# Patient Record
Sex: FEMALE | Race: WHITE | Employment: OTHER | URBAN - METROPOLITAN AREA
[De-identification: names, ages, dates, MRNs, and addresses within clinical notes are randomized per-mention and may not be internally consistent; named-entity substitution may affect disease eponyms.]

---

## 2018-03-02 ENCOUNTER — HOSPITAL ENCOUNTER (OUTPATIENT)
Dept: LAB | Age: 43
Discharge: HOME | End: 2018-03-02
Payer: COMMERCIAL

## 2018-03-02 DIAGNOSIS — R07.89 CHEST DISCOMFORT: ICD-10-CM

## 2018-03-02 PROBLEM — Z86.79 H/O CARDIAC MURMUR: Status: ACTIVE | Noted: 2018-03-02

## 2018-03-02 LAB — ERYTHROCYTE [SEDIMENTATION RATE] IN BLOOD: 15 MM/HR (ref 0–20)

## 2018-03-02 PROCEDURE — 36415 COLL VENOUS BLD VENIPUNCTURE: CPT | Performed by: INTERNAL MEDICINE

## 2018-03-02 PROCEDURE — 85652 RBC SED RATE AUTOMATED: CPT | Performed by: INTERNAL MEDICINE

## 2018-04-04 PROBLEM — R07.89 OTHER CHEST PAIN: Status: ACTIVE | Noted: 2018-04-04

## 2018-04-04 PROBLEM — I35.1 NONRHEUMATIC AORTIC VALVE INSUFFICIENCY: Status: ACTIVE | Noted: 2018-04-04

## 2018-04-30 ENCOUNTER — HOSPITAL ENCOUNTER (OUTPATIENT)
Dept: PHYSICAL THERAPY | Age: 43
Discharge: HOME | End: 2018-04-30
Payer: COMMERCIAL

## 2018-04-30 DIAGNOSIS — M54.6 THORACIC SPINE PAIN: ICD-10-CM

## 2018-04-30 DIAGNOSIS — R07.89 COSTOCHONDRAL CHEST PAIN: ICD-10-CM

## 2018-04-30 PROCEDURE — 97162 PT EVAL MOD COMPLEX 30 MIN: CPT

## 2018-04-30 PROCEDURE — 97110 THERAPEUTIC EXERCISES: CPT

## 2018-04-30 NOTE — PROGRESS NOTES
Ambulatory/Rehab Services H2 Model Falls Risk Assessment    Risk Factor Pts. ·   Confusion/Disorientation/Impulsivity  []    4 ·   Symptomatic Depression  []   2 ·   Altered Elimination  []   1 ·   Dizziness/Vertigo  []   1 ·   Gender (Male)  []   1 ·   Any administered antiepileptics (anticonvulsants):  []   2 ·   Any administered benzodiazepines:  []   1 ·   Visual Impairment (specify):  []   1 ·   Portable Oxygen Use  []   1 ·   Orthostatic ? BP  []   1 ·   History of Recent Falls (within 3 mos.)  []   5     Ability to Rise from Chair (choose one) Pts. ·   Ability to rise in a single movement  [x]   0 ·   Pushes up, successful in one attempt  []   1 ·   Multiple attempts, but successful  []   3 ·   Unable to rise without assistance  []   4   Total: (5 or greater = High Risk) 0     Falls Prevention Plan:   []                Physical Limitations to Exercise (specify):   []                Mobility Assistance Device (type):   []                Exercise/Equipment Adaptation (specify):    ©2010 [facility] Patent #[id number].  Federal Law prohibits the replication, distribution or use without written permission from [facility] [employer]

## 2018-04-30 NOTE — THERAPY EVALUATION
Gregorio Davis  : 1975  Payor: [employer] / Plan: [address] HMO / Product Type: HMO /  [address] at [place] [address] Therapy  [address], [address]  [phone]   [phone]         OUTPATIENT PHYSICAL [place] Assessment 2018    ICD-10: Treatment Diagnosis: pain in thoracic spine M54.6, muscle weakness M62.81,     Precautions/Allergies:   Shellfish derived   Fall Risk Score: 0 (? 5 = High Risk)  MD Orders: Eval and treat MEDICAL/REFERRING DIAGNOSIS:  Thoracic spine pain [M54.6]  Costochondral chest pain [R07.1]   DATE OF ONSET: 2018  REFERRING PHYSICIAN: Jossy Amaral, *  RETURN PHYSICIAN APPOINTMENT: as needed     INITIAL ASSESSMENT:  Ms. Darrin Nash presents with increased left chest pain just inferior to the clavicle and then her pain radiates down into the arm slightly and into the shoulder blades and thoracic spine. She reported no injury to the region and the pain worsened in January that she did undergo ER evaluation which came back negative for any cardiac history. She has also had a follow-up with cardiologist which revealed a slight murmur, but echo and treadmill stress testing were normal.  She is reporting her prescribed NSAID is helping to relieve some of the pain. She continues to report folding laundry, housework and computer work can increase her symptoms. She reports lying down relieves her symptoms. She would like to try therapy to see if her symptoms improve. PROBLEM LIST (Impacting functional limitations):  1. Decreased Strength  2. Decreased ADL/Functional Activities  3. Increased Pain  4. Decreased Activity Tolerance INTERVENTIONS PLANNED:  1. Heat  2. Home Exercise Program (HEP)  3. Manual Therapy  4. Therapeutic Exercise/Strengthening  5. Ultrasound (US)   TREATMENT PLAN:  Effective Dates: 2018 TO 2018 (60 days).  Frequency/Duration: 2 times a week for 60 Days    GOALS: (Goals have been discussed and agreed upon with patient.)  Short-Term Functional Goals: Time Frame: 4 weeks  1. Establish independent HEP with no cueing. 2. Pt will be able to report 5/10 pain rating or less with ADL's.  3. Pt will be able to work on her computer for 20 minutes with less pain. Discharge Goals: Time Frame: 8 weeks  1. Pt will be able to report 3/10 pain rating or less with ADL's.  2. Pt will be able to fold laundry, vacuum up to 20 minutes with no increase in symptoms. 3. Pt will be able to increase strength in bilateral UE's by at least one full grade for improved lifting abilities. Rehabilitation Potential For Stated Goals: Good  Regarding Codi Valentine's therapy, I certify that the treatment plan above will be carried out by a therapist or under their direction. Thank you for this referral,  Irving Purcell, PT     Referring Physician Signature: Marina Herrera, Lien Garcia, *              Date                    The information in this section was collected on 4/30/18 (except where otherwise noted). HISTORY:   History of Present Injury/Illness (Reason for Referral):  Ms. Leroy Swan presents with increased left chest pain just inferior to the clavicle and then her pain radiates down into the arm slightly and into the shoulder blades and thoracic spine. She reported no injury to the region and the pain worsened in January that she did undergo ER evaluation which came back negative for any cardiac history. She has also had a follow-up with cardiologist which revealed a slight murmur, but echo and treadmill stress testing were normal.  She is reporting her prescribed NSAID is helping to relieve some of the pain. She continues to report folding laundry, housework and computer work can increase her symptoms. She reports lying down relieves her symptoms. She would like to try therapy to see if her symptoms improve.   Past Medical History/Comorbidities:   Ms. Leroy Swan  has a past medical history of Dyspepsia and other specified disorders of function of stomach; Irregular heart beat; and Obesity (BMI 30-39.9). She also has no past medical history of Difficult intubation; Malignant hyperthermia due to anesthesia; Nausea & vomiting; Pseudocholinesterase deficiency; or Unspecified adverse effect of anesthesia. Ms. Gloria Ramires  has a past surgical history that includes hx orthopaedic; hx  section; hx leep procedure; hx tonsillectomy (childhood); and hx cholecystectomy. Social History/Living Environment:     pt lives with supportive family. She has a 8year old sone and 15year old son  Prior Level of Function/Work/Activity:  Pt works part-time as a   Dominant Side:         RIGHT  Current Medications:       Current Outpatient Prescriptions:     meloxicam (MOBIC) 15 mg tablet, Take 1 Tab by mouth daily. , Disp: 30 Tab, Rfl: 1    cyclobenzaprine (FLEXERIL) 10 mg tablet, Take 1 Tab by mouth nightly., Disp: 30 Tab, Rfl: 1    Magnesium Oxide 500 mg cap, Take  by mouth., Disp: , Rfl:    Date Last Reviewed:  2018     Number of Personal Factors/Comorbidities that affect the Plan of Care: 1-2: MODERATE COMPLEXITY   EXAMINATION:   Palpation:          Increased pain just inferior to clavicle around pec major  ROM:        Bilateral UE motion WFL  Bilateral UE ROM WFL  Cervical ROM WFL                                    Strength:     R UE flex 4/5, abd 4-/5, ER 4-/5, IR 4-/5, biceps 4/5         L UE flex 3+/5, abd 3+/5, ER 3+/5, IR 3+/5, biceps 4-/5            Special Tests: negative Sherial Humbles and negative empty can for left shoulder. She tested positive for pain with resisted pectoral testing  Neurological Screen: na  Balance:  na   Body Structures Involved:  1. Muscles  2. Ligaments Body Functions Affected:  1. Sensory/Pain  2. Neuromusculoskeletal  3. Movement Related Activities and Participation Affected:  1. General Tasks and Demands  2. Self Care  3.  Domestic Life  4. Interpersonal Interactions and Relationships  5. Community, Social and [place]   Number of elements (examined above) that affect the Plan of Care: 4+: HIGH COMPLEXITY   CLINICAL PRESENTATION:   Presentation: Evolving clinical presentation with changing clinical characteristics: MODERATE COMPLEXITY   CLINICAL DECISION MAKING:   Outcome Measure: Tool Used: Disabilities of the Arm, Shoulder and Hand (DASH) Questionnaire - Quick Version  Score:  Initial: 26/55  Most Recent: X/55 (Date: -- )   Interpretation of Score: The DASH is designed to measure the activities of daily living in person's with upper extremity dysfunction or pain. Each section is scored on a 1-5 scale, 5 representing the greatest disability. The scores of each section are added together for a total score of 55. Score 11 12-19 20-28 29-37 38-45 46-54 55   Modifier CH CI CJ CK CL CM CN           Medical Necessity:   · Patient is expected to demonstrate progress in strength and pain levels to increase her ability to use the left arm for housework and computer work. Reason for Services/Other Comments:  · Patient continues to require skilled intervention due to ongoing pain in the left chest along pectorals that radiates into the shoulder blades. this pain affects her ability to perform housework and her computer work. Use of outcome tool(s) and clinical judgement create a POC that gives a: Questionable prediction of patient's progress: MODERATE COMPLEXITY            TREATMENT:   (In addition to Assessment/Re-Assessment sessions the following treatments were rendered)  Pre-treatment Symptoms/Complaints:  Pt reporting she is having mild left sided chest pain. Pain: Initial:   Pain Intensity 1: 8  Post Session:  8/10     THERAPEUTIC EXERCISE: (20 minutes):  Exercises per grid below to improve mobility and strength. Required minimal verbal cues to promote proper body mechanics.   Progressed resistance, range, repetitions and complexity of movement as indicated. Pectoral corner stretching 3x hold 20 sec  Biceps stretching with arm on doorway 3x hold 20 sec  Upper thoracic stretching x 1 hold 20 sec  Review of posterior capsule stretching on left   Prone shoulder ext green TB x 15  Seated h. abd with green TB x 10  Seated scap retract green TB x 12  Evaluation (  xx   ):    Manual Therapy (   na   ): na    Therapeutic Modalities:na    HEP: As above; handouts given to patient for all exercises. Access Code: [email or web address]   URL: [email or web address]   Date: 04/30/2018   Prepared by: Micaela Dayton     Exercises   Corner Pec Major Stretch - 5 reps - 1 sets - 2x daily   Standing Bicep Stretch at Wall - 5 reps - 1 sets - 2x daily   Chest and Bicep Stretch - Arms Behind Back - 5 reps - 1 sets - 2x daily   Standing Shoulder Posterior Capsule Stretch - 5 reps - 1 sets - 2x daily   Standing Lower Cervical and Upper Thoracic Stretch - 5 reps - 1 sets - 2x daily    Treatment/Session Assessment:    · Response to Treatment:  Pt reporting she was slightly sore post assessment and review of HEP. She tested positive for pain with resisted pectoral testing. She had no other issues at the shoulder or the clavicle region. It appears her pain is due to pectoral tightness and weakness along with some postural deficiencies. She should benefit from stretching, strengthening and modalities or taping as needed. · Compliance with Program/Exercises: Will assess as treatment progresses. · Recommendations/Intent for next treatment session: \"Next visit will focus on advancements to more challenging activities\".   Total Treatment Duration: 45 min  PT Patient Time In/Time Out  Time In: 0900  Time Out: 0945  Treatment number [id number] Katt Figueroa, PT

## 2018-05-02 ENCOUNTER — HOSPITAL ENCOUNTER (OUTPATIENT)
Dept: PHYSICAL THERAPY | Age: 43
Discharge: HOME | End: 2018-05-02
Payer: COMMERCIAL

## 2018-05-02 PROCEDURE — 97110 THERAPEUTIC EXERCISES: CPT

## 2018-05-02 PROCEDURE — 97140 MANUAL THERAPY 1/> REGIONS: CPT

## 2018-05-02 NOTE — PROGRESS NOTES
Christen Segovia  : 1975  Payor: Nayely Becerra / Plan: [address] HMO / Product Type: HMO /  [address] [place] [place]  at [place] [address] Therapy  [address], [address]  [phone]   [phone]         OUTPATIENT PHYSICAL [address] Note 2018    ICD-10: Treatment Diagnosis: pain in thoracic spine M54.6, muscle weakness M62.81,     Precautions/Allergies:   Shellfish derived   Fall Risk Score: 0 (? 5 = High Risk)  MD Orders: Eval and treat MEDICAL/REFERRING DIAGNOSIS:  Pain in thoracic spine [M54.6]  Chest pain on breathing [R07.1]   DATE OF ONSET: 2018  REFERRING PHYSICIAN: Velinda Galeazzi, Jean Pierre Farias, *  RETURN PHYSICIAN APPOINTMENT: as needed     INITIAL ASSESSMENT:  Ms. Socorro Groves presents with increased left chest pain just inferior to the clavicle and then her pain radiates down into the arm slightly and into the shoulder blades and thoracic spine. She reported no injury to the region and the pain worsened in January that she did undergo ER evaluation which came back negative for any cardiac history. She has also had a follow-up with cardiologist which revealed a slight murmur, but echo and treadmill stress testing were normal.  She is reporting her prescribed NSAID is helping to relieve some of the pain. She continues to report folding laundry, housework and computer work can increase her symptoms. She reports lying down relieves her symptoms. She would like to try therapy to see if her symptoms improve. PROBLEM LIST (Impacting functional limitations):  1. Decreased Strength  2. Decreased ADL/Functional Activities  3. Increased Pain  4. Decreased Activity Tolerance INTERVENTIONS PLANNED:  1. Heat  2. Home Exercise Program (HEP)  3. Manual Therapy  4. Therapeutic Exercise/Strengthening  5. Ultrasound (US)   TREATMENT PLAN:  Effective Dates: 2018 TO 2018 (60 days).  Frequency/Duration: 2 times a week for 60 Days    GOALS: (Goals have been discussed and agreed upon with patient.)  Short-Term Functional Goals: Time Frame: 4 weeks  1. Establish independent HEP with no cueing. 2. Pt will be able to report 5/10 pain rating or less with ADL's.  3. Pt will be able to work on her computer for 20 minutes with less pain. Discharge Goals: Time Frame: 8 weeks  1. Pt will be able to report 3/10 pain rating or less with ADL's.  2. Pt will be able to fold laundry, vacuum up to 20 minutes with no increase in symptoms. 3. Pt will be able to increase strength in bilateral UE's by at least one full grade for improved lifting abilities. Rehabilitation Potential For Stated Goals: Good  Regarding Carson Valentine's therapy, I certify that the treatment plan above will be carried out by a therapist or under their direction. Thank you for this referral,  Minal Schulte PT     Referring Physician Signature: Mira Huitron, Aleksandr Parish, *              Date                    The information in this section was collected on 4/30/18 (except where otherwise noted). HISTORY:   History of Present Injury/Illness (Reason for Referral):  Ms. Ren Quinn presents with increased left chest pain just inferior to the clavicle and then her pain radiates down into the arm slightly and into the shoulder blades and thoracic spine. She reported no injury to the region and the pain worsened in January that she did undergo ER evaluation which came back negative for any cardiac history. She has also had a follow-up with cardiologist which revealed a slight murmur, but echo and treadmill stress testing were normal.  She is reporting her prescribed NSAID is helping to relieve some of the pain. She continues to report folding laundry, housework and computer work can increase her symptoms. She reports lying down relieves her symptoms. She would like to try therapy to see if her symptoms improve.   Past Medical History/Comorbidities:   Ms. Ren Quinn  has a past medical history of Dyspepsia and other specified disorders of function of stomach; Irregular heart beat; and Obesity (BMI 30-39.9). She also has no past medical history of Difficult intubation; Malignant hyperthermia due to anesthesia; Nausea & vomiting; Pseudocholinesterase deficiency; or Unspecified adverse effect of anesthesia. Ms. Ren Quinn  has a past surgical history that includes hx orthopaedic; hx  section; hx leep procedure; hx tonsillectomy (childhood); and hx cholecystectomy. Social History/Living Environment:     pt lives with supportive family. She has a 8year old sone and 15year old son  Prior Level of Function/Work/Activity:  Pt works part-time as a   Dominant Side:         RIGHT  Current Medications:       Current Outpatient Prescriptions:     meloxicam (MOBIC) 15 mg tablet, Take 1 Tab by mouth daily. , Disp: 30 Tab, Rfl: 1    cyclobenzaprine (FLEXERIL) 10 mg tablet, Take 1 Tab by mouth nightly., Disp: 30 Tab, Rfl: 1    Magnesium Oxide 500 mg cap, Take  by mouth., Disp: , Rfl:    Date Last Reviewed:  2018     Number of Personal Factors/Comorbidities that affect the Plan of Care: 1-2: MODERATE COMPLEXITY   EXAMINATION:   Palpation:          Increased pain just inferior to clavicle around pec major  ROM:        Bilateral UE motion WFL  Bilateral UE ROM WFL  Cervical ROM WFL                                    Strength:     R UE flex 4/5, abd 4-/5, ER 4-/5, IR 4-/5, biceps 4/5         L UE flex 3+/5, abd 3+/5, ER 3+/5, IR 3+/5, biceps 4-/5            Special Tests: negative Tempie Salon and negative empty can for left shoulder. She tested positive for pain with resisted pectoral testing  Neurological Screen: na  Balance:  na   Body Structures Involved:  1. Muscles  2. Ligaments Body Functions Affected:  1. Sensory/Pain  2. Neuromusculoskeletal  3. Movement Related Activities and Participation Affected:  1. General Tasks and Demands  2. Self Care  3. Domestic Life  4.  Interpersonal Interactions and Relationships  5. Community, Social and [place]   Number of elements (examined above) that affect the Plan of Care: 4+: HIGH COMPLEXITY   CLINICAL PRESENTATION:   Presentation: Evolving clinical presentation with changing clinical characteristics: MODERATE COMPLEXITY   CLINICAL DECISION MAKING:   Outcome Measure: Tool Used: Disabilities of the Arm, Shoulder and Hand (DASH) Questionnaire - Quick Version  Score:  Initial: 26/55  Most Recent: X/55 (Date: -- )   Interpretation of Score: The DASH is designed to measure the activities of daily living in person's with upper extremity dysfunction or pain. Each section is scored on a 1-5 scale, 5 representing the greatest disability. The scores of each section are added together for a total score of 55. Score 11 12-19 20-28 29-37 38-45 46-54 55   Modifier CH CI CJ CK CL CM CN           Medical Necessity:   · Patient is expected to demonstrate progress in strength and pain levels to increase her ability to use the left arm for housework and computer work. Reason for Services/Other Comments:  · Patient continues to require skilled intervention due to ongoing pain in the left chest along pectorals that radiates into the shoulder blades. this pain affects her ability to perform housework and her computer work. Use of outcome tool(s) and clinical judgement create a POC that gives a: Questionable prediction of patient's progress: MODERATE COMPLEXITY            TREATMENT:   (In addition to Assessment/Re-Assessment sessions the following treatments were rendered)  Pre-treatment Symptoms/Complaints:  Pt reporting she felt like she was looser and had more ROM after her last treatment, but the next day she was sore. Pain: Initial:   Pain Intensity 1: 7  Post Session:  7/10     THERAPEUTIC EXERCISE: (25 minutes):  Exercises per grid below to improve mobility and strength. Required minimal verbal cues to promote proper body mechanics.   Progressed resistance, range, repetitions and complexity of movement as indicated. UBE x 5 min  Biceps stretching with arm on doorway 3x hold 20 sec  t-bar overhead stretch x 10 hold 5 sec at top  t-bar ext stretching x 10 hold 10 sec  IR 15# x 20  Rows 20# x 20  Punches 15# x 20  Prone shoulder ext 2# x 20  Prone shoulder rows 4# x 20  Prone h. abd with light assistance x 12  Prone scap retract to work on scapular depression-mild cuing required      Manual Therapy (   15 min   ): STM to pectoral region in supine along with some stretching to the pectorals    Therapeutic Modalities:MHP x 10 minutes post treatment for pain relief    HEP: continue as directed  Access Code: [email or web address]   URL: [email or web address]   Date: 04/30/2018   Prepared by: Gregory Slight     Exercises   Corner Pec Major Stretch - 5 reps - 1 sets - 2x daily   Standing Bicep Stretch at Wall - 5 reps - 1 sets - 2x daily   Chest and Bicep Stretch - Arms Behind Back - 5 reps - 1 sets - 2x daily   Standing Shoulder Posterior Capsule Stretch - 5 reps - 1 sets - 2x daily   Standing Lower Cervical and Upper Thoracic Stretch - 5 reps - 1 sets - 2x daily    Treatment/Session Assessment:    · Response to Treatment:  Pt reporting feeling looser initially, but the day post her treatment she was sore. She reported using heat at home and it has helped. She remains tender along the pectoral region and just inferior to the clavicle. Pt received strengthening as well as stretching and STM to help decrease her pain levels. the left arm remains very weak. · Compliance with Program/Exercises: Will assess as treatment progresses. · Recommendations/Intent for next treatment session: \"Next visit will focus on advancements to more challenging activities\".   Total Treatment Duration: 60 min  PT Patient Time In/Time Out  Time In: 1230  Time Out: 0130  Treatment number 2  Benedict Shah, PT

## 2018-05-08 ENCOUNTER — HOSPITAL ENCOUNTER (OUTPATIENT)
Dept: PHYSICAL THERAPY | Age: 43
Discharge: HOME | End: 2018-05-08
Payer: COMMERCIAL

## 2018-05-08 NOTE — PROGRESS NOTES
Therapy Center at [address], [address]  [phone]   [phone]    OUTPATIENT DAILY NOTE    NAME/AGE/GENDER: Jaymie Austin is a 44 y.o. female. DATE: 5/8/2018    Patient cancelled appointment today due to sick child. Will plan to follow up on next scheduled visit.     Inocente Castro, PT

## 2018-05-11 ENCOUNTER — HOSPITAL ENCOUNTER (OUTPATIENT)
Dept: PHYSICAL THERAPY | Age: 43
Discharge: HOME | End: 2018-05-11
Payer: COMMERCIAL

## 2018-05-11 PROCEDURE — 97110 THERAPEUTIC EXERCISES: CPT

## 2018-05-11 PROCEDURE — 97140 MANUAL THERAPY 1/> REGIONS: CPT

## 2018-05-11 NOTE — PROGRESS NOTES
Debo Combs  : 1975  Payor: Marvel Curry / Plan: [address] HMO / Product Type: HMO /  [address] at [place] [address] Therapy  [address], [address]  [phone]   [phone]         OUTPATIENT PHYSICAL [address] Note 2018    ICD-10: Treatment Diagnosis: pain in thoracic spine M54.6, muscle weakness M62.81,     Precautions/Allergies:   Shellfish derived   Fall Risk Score: 0 (? 5 = High Risk)  MD Orders: Eval and treat MEDICAL/REFERRING DIAGNOSIS:  Pain in thoracic spine [M54.6]  Chest pain on breathing [R07.1]   DATE OF ONSET: 2018  REFERRING PHYSICIAN: Ronak Flores, *  RETURN PHYSICIAN APPOINTMENT: as needed     INITIAL ASSESSMENT:  Ms. Arnie Mcintyre presents with increased left chest pain just inferior to the clavicle and then her pain radiates down into the arm slightly and into the shoulder blades and thoracic spine. She reported no injury to the region and the pain worsened in January that she did undergo ER evaluation which came back negative for any cardiac history. She has also had a follow-up with cardiologist which revealed a slight murmur, but echo and treadmill stress testing were normal.  She is reporting her prescribed NSAID is helping to relieve some of the pain. She continues to report folding laundry, housework and computer work can increase her symptoms. She reports lying down relieves her symptoms. She would like to try therapy to see if her symptoms improve. PROBLEM LIST (Impacting functional limitations):  1. Decreased Strength  2. Decreased ADL/Functional Activities  3. Increased Pain  4. Decreased Activity Tolerance INTERVENTIONS PLANNED:  1. Heat  2. Home Exercise Program (HEP)  3. Manual Therapy  4. Therapeutic Exercise/Strengthening  5. Ultrasound (US)   TREATMENT PLAN:  Effective Dates: 2018 TO 2018 (60 days).  Frequency/Duration: 2 times a week for 60 Days    GOALS: (Goals have been discussed and agreed upon with patient.)  Short-Term Functional Goals: Time Frame: 4 weeks  1. Establish independent HEP with no cueing. 2. Pt will be able to report 5/10 pain rating or less with ADL's.  3. Pt will be able to work on her computer for 20 minutes with less pain. Discharge Goals: Time Frame: 8 weeks  1. Pt will be able to report 3/10 pain rating or less with ADL's.  2. Pt will be able to fold laundry, vacuum up to 20 minutes with no increase in symptoms. 3. Pt will be able to increase strength in bilateral UE's by at least one full grade for improved lifting abilities. Rehabilitation Potential For Stated Goals: Good  Regarding Hodan Valentine's therapy, I certify that the treatment plan above will be carried out by a therapist or under their direction. Thank you for this referral,  Dean Dao PT                 The information in this section was collected on 4/30/18 (except where otherwise noted). HISTORY:   History of Present Injury/Illness (Reason for Referral):  Ms. Adelaida Awan presents with increased left chest pain just inferior to the clavicle and then her pain radiates down into the arm slightly and into the shoulder blades and thoracic spine. She reported no injury to the region and the pain worsened in January that she did undergo ER evaluation which came back negative for any cardiac history. She has also had a follow-up with cardiologist which revealed a slight murmur, but echo and treadmill stress testing were normal.  She is reporting her prescribed NSAID is helping to relieve some of the pain. She continues to report folding laundry, housework and computer work can increase her symptoms. She reports lying down relieves her symptoms. She would like to try therapy to see if her symptoms improve.   Past Medical History/Comorbidities:   Ms. Adelaida Awan  has a past medical history of Dyspepsia and other specified disorders of function of stomach; Irregular heart beat; and Obesity (BMI 30-39.9). She also has no past medical history of Difficult intubation; Malignant hyperthermia due to anesthesia; Nausea & vomiting; Pseudocholinesterase deficiency; or Unspecified adverse effect of anesthesia. Ms. Micaela Colvin  has a past surgical history that includes hx orthopaedic; hx  section; hx leep procedure; hx tonsillectomy (childhood); and hx cholecystectomy. Social History/Living Environment:     pt lives with supportive family. She has a 8year old sone and 15year old son  Prior Level of Function/Work/Activity:  Pt works part-time as a   Dominant Side:         RIGHT  Current Medications:       Current Outpatient Prescriptions:     meloxicam (MOBIC) 15 mg tablet, Take 1 Tab by mouth daily. , Disp: 30 Tab, Rfl: 1    cyclobenzaprine (FLEXERIL) 10 mg tablet, Take 1 Tab by mouth nightly., Disp: 30 Tab, Rfl: 1    Magnesium Oxide 500 mg cap, Take  by mouth., Disp: , Rfl:    Date Last Reviewed:  2018     Number of Personal Factors/Comorbidities that affect the Plan of Care: 1-2: MODERATE COMPLEXITY   EXAMINATION:   Palpation:          Increased pain just inferior to clavicle around pec major  ROM:        Bilateral UE motion WFL  Bilateral UE ROM WFL  Cervical ROM WFL                                    Strength:     R UE flex 4/5, abd 4-/5, ER 4-/5, IR 4-/5, biceps 4/5         L UE flex 3+/5, abd 3+/5, ER 3+/5, IR 3+/5, biceps 4-/5            Special Tests: negative [employer] and negative empty can for left shoulder. She tested positive for pain with resisted pectoral testing  Neurological Screen: na  Balance:  na   Body Structures Involved:  1. Muscles  2. Ligaments Body Functions Affected:  1. Sensory/Pain  2. Neuromusculoskeletal  3. Movement Related Activities and Participation Affected:  1. General Tasks and Demands  2. Self Care  3. Domestic Life  4. Interpersonal Interactions and Relationships  5.  Community, Social and [place]   Number of elements (examined above) that affect the Plan of Care: 4+: HIGH COMPLEXITY   CLINICAL PRESENTATION:   Presentation: Evolving clinical presentation with changing clinical characteristics: MODERATE COMPLEXITY   CLINICAL DECISION MAKING:   Outcome Measure: Tool Used: Disabilities of the Arm, Shoulder and Hand (DASH) Questionnaire - Quick Version  Score:  Initial: 26/55  Most Recent: X/55 (Date: -- )   Interpretation of Score: The DASH is designed to measure the activities of daily living in person's with upper extremity dysfunction or pain. Each section is scored on a 1-5 scale, 5 representing the greatest disability. The scores of each section are added together for a total score of 55. Score 11 12-19 20-28 29-37 38-45 46-54 55   Modifier CH CI CJ CK CL CM CN           Medical Necessity:   · Patient is expected to demonstrate progress in strength and pain levels to increase her ability to use the left arm for housework and computer work. Reason for Services/Other Comments:  · Patient continues to require skilled intervention due to ongoing pain in the left chest along pectorals that radiates into the shoulder blades. this pain affects her ability to perform housework and her computer work. Use of outcome tool(s) and clinical judgement create a POC that gives a: Questionable prediction of patient's progress: MODERATE COMPLEXITY            TREATMENT:   (In addition to Assessment/Re-Assessment sessions the following treatments were rendered)  Pre-treatment Symptoms/Complaints:  Pt reporting she is feeling better. She has not had to take the muscle relaxer at night, but continues to take NSAID. Pain: Initial:   Pain Intensity 1: 6  Post Session:  5/10     THERAPEUTIC EXERCISE: (30 minutes):  Exercises per grid below to improve mobility and strength. Required minimal verbal cues to promote proper body mechanics. Progressed resistance, range, repetitions and complexity of movement as indicated.   UBE x 5 min  Biceps stretching with arm on doorway 3x hold 20 sec  t-bar overhead stretch x 10 hold 5 sec at top  t-bar ext stretching x 10 hold 10 sec  IR 15# x 20  Rows 20# x 20  Punches 15# x 20  Prone shoulder ext 2# x 20  Prone shoulder rows 4# x 20  Prone h. abd with light assistance x 12  Prone scap retract to work on scapular depression-mild cuing required  Sidelying ER 3# x 20    Manual Therapy (   20 min   ): STM to pectoral region in supine along with some stretching to the pectorals    Therapeutic Modalities:MHP x 10 minutes post treatment for pain relief    HEP: continue as directed  Access Code: [email or web address]   URL: [email or web address]   Date: 04/30/2018   Prepared by: Elier Rowan     Exercises   Corner Pec Major Stretch - 5 reps - 1 sets - 2x daily   Standing Bicep Stretch at Wall - 5 reps - 1 sets - 2x daily   Chest and Bicep Stretch - Arms Behind Back - 5 reps - 1 sets - 2x daily   Standing Shoulder Posterior Capsule Stretch - 5 reps - 1 sets - 2x daily   Standing Lower Cervical and Upper Thoracic Stretch - 5 reps - 1 sets - 2x daily    Treatment/Session Assessment:    · Response to Treatment:  Pt reporting feeling like the therapy has helped some. She has not had to take her muscle relaxer at night. She continues to have pain with stressing the left shoulder region and she is having most of her discomfort inferior to the clavicle and along the pectoral region. Will continue with current treatment plan. · Compliance with Program/Exercises: Will assess as treatment progresses. · Recommendations/Intent for next treatment session: \"Next visit will focus on advancements to more challenging activities\".   Total Treatment Duration: 60 min  PT Patient Time In/Time Out  Time In: 0900  Time Out: 1000  Treatment number [id number] Katt Figueroa, PT

## 2018-05-15 ENCOUNTER — HOSPITAL ENCOUNTER (OUTPATIENT)
Dept: PHYSICAL THERAPY | Age: 43
Discharge: HOME | End: 2018-05-15
Payer: COMMERCIAL

## 2018-05-15 PROCEDURE — 97110 THERAPEUTIC EXERCISES: CPT

## 2018-05-15 PROCEDURE — 97140 MANUAL THERAPY 1/> REGIONS: CPT

## 2018-05-15 NOTE — PROGRESS NOTES
[facility]  : 1975  Payor: Vik Montes / Plan: [address] HMO / Product Type: HMO /  [address] at [facility] Therapy  [address], [facility], [address]  [phone]   [phone]         OUTPATIENT PHYSICAL [address] Note 5/15/2018    ICD-10: Treatment Diagnosis: pain in thoracic spine M54.6, muscle weakness M62.81,     Precautions/Allergies:   Shellfish derived   Fall Risk Score: 0 (? 5 = High Risk)  MD Orders: Eval and treat MEDICAL/REFERRING DIAGNOSIS:  Pain in thoracic spine [M54.6]  Chest pain on breathing [R07.1]   DATE OF ONSET: 2018  REFERRING PHYSICIAN: Vasquez Corral, *  RETURN PHYSICIAN APPOINTMENT: as needed     INITIAL ASSESSMENT:  Ms. Sergo Sheets presents with increased left chest pain just inferior to the clavicle and then her pain radiates down into the arm slightly and into the shoulder blades and thoracic spine. She reported no injury to the region and the pain worsened in January that she did undergo ER evaluation which came back negative for any cardiac history. She has also had a follow-up with cardiologist which revealed a slight murmur, but echo and treadmill stress testing were normal.  She is reporting her prescribed NSAID is helping to relieve some of the pain. She continues to report folding laundry, housework and computer work can increase her symptoms. She reports lying down relieves her symptoms. She would like to try therapy to see if her symptoms improve. PROBLEM LIST (Impacting functional limitations):  1. Decreased Strength  2. Decreased ADL/Functional Activities  3. Increased Pain  4. Decreased Activity Tolerance INTERVENTIONS PLANNED:  1. Heat  2. Home Exercise Program (HEP)  3. Manual Therapy  4. Therapeutic Exercise/Strengthening  5. Ultrasound (US)   TREATMENT PLAN:  Effective Dates: 2018 TO 2018 (60 days).  Frequency/Duration: 2 times a week for 60 Days    GOALS: (Goals have been discussed and agreed upon with patient.)  Short-Term Functional Goals: Time Frame: 4 weeks  1. Establish independent HEP with no cueing. 2. Pt will be able to report 5/10 pain rating or less with ADL's.  3. Pt will be able to work on her computer for 20 minutes with less pain. Discharge Goals: Time Frame: 8 weeks  1. Pt will be able to report 3/10 pain rating or less with ADL's.  2. Pt will be able to fold laundry, vacuum up to 20 minutes with no increase in symptoms. 3. Pt will be able to increase strength in bilateral UE's by at least one full grade for improved lifting abilities. Rehabilitation Potential For Stated Goals: Good  Regarding Holly Valentine's therapy, I certify that the treatment plan above will be carried out by a therapist or under their direction. Thank you for this referral,  Monica Cifuentes PT                 The information in this section was collected on 4/30/18 (except where otherwise noted). HISTORY:   History of Present Injury/Illness (Reason for Referral):  Ms. Nathen Schulz presents with increased left chest pain just inferior to the clavicle and then her pain radiates down into the arm slightly and into the shoulder blades and thoracic spine. She reported no injury to the region and the pain worsened in January that she did undergo ER evaluation which came back negative for any cardiac history. She has also had a follow-up with cardiologist which revealed a slight murmur, but echo and treadmill stress testing were normal.  She is reporting her prescribed NSAID is helping to relieve some of the pain. She continues to report folding laundry, housework and computer work can increase her symptoms. She reports lying down relieves her symptoms. She would like to try therapy to see if her symptoms improve.   Past Medical History/Comorbidities:   Ms. Nathen Schulz  has a past medical history of Dyspepsia and other specified disorders of function of stomach; Irregular heart beat; and Obesity (BMI 30-39.9). She also has no past medical history of Difficult intubation; Malignant hyperthermia due to anesthesia; Nausea & vomiting; Pseudocholinesterase deficiency; or Unspecified adverse effect of anesthesia. Ms. Sergo Sheets  has a past surgical history that includes hx orthopaedic; hx  section; hx leep procedure; hx tonsillectomy (childhood); and hx cholecystectomy. Social History/Living Environment:     pt lives with supportive family. She has a 8year old sone and 15year old son  Prior Level of Function/Work/Activity:  Pt works part-time as a   Dominant Side:         RIGHT  Current Medications:       Current Outpatient Prescriptions:     meloxicam (MOBIC) 15 mg tablet, Take 1 Tab by mouth daily. , Disp: 30 Tab, Rfl: 1    cyclobenzaprine (FLEXERIL) 10 mg tablet, Take 1 Tab by mouth nightly., Disp: 30 Tab, Rfl: 1    Magnesium Oxide 500 mg cap, Take  by mouth., Disp: , Rfl:    Date Last Reviewed:  5/15/2018     Number of Personal Factors/Comorbidities that affect the Plan of Care: 1-2: MODERATE COMPLEXITY   EXAMINATION:   Palpation:          Increased pain just inferior to clavicle around pec major  ROM:        Bilateral UE motion WFL  Bilateral UE ROM WFL  Cervical ROM WFL                                    Strength:     R UE flex 4/5, abd 4-/5, ER 4-/5, IR 4-/5, biceps 4/5         L UE flex 3+/5, abd 3+/5, ER 3+/5, IR 3+/5, biceps 4-/5            Special Tests: negative Elizabethann Jewel and negative empty can for left shoulder. She tested positive for pain with resisted pectoral testing  Neurological Screen: na  Balance:  na   Body Structures Involved:  1. Muscles  2. Ligaments Body Functions Affected:  1. Sensory/Pain  2. Neuromusculoskeletal  3. Movement Related Activities and Participation Affected:  1. General Tasks and Demands  2. Self Care  3. Domestic Life  4. Interpersonal Interactions and Relationships  5.  Community, Social and [place]   Number of elements (examined above) that affect the Plan of Care: 4+: HIGH COMPLEXITY   CLINICAL PRESENTATION:   Presentation: Evolving clinical presentation with changing clinical characteristics: MODERATE COMPLEXITY   CLINICAL DECISION MAKING:   Outcome Measure: Tool Used: Disabilities of the Arm, Shoulder and Hand (DASH) Questionnaire - Quick Version  Score:  Initial: 26/55  Most Recent: X/55 (Date: -- )   Interpretation of Score: The DASH is designed to measure the activities of daily living in person's with upper extremity dysfunction or pain. Each section is scored on a 1-5 scale, 5 representing the greatest disability. The scores of each section are added together for a total score of 55. Score 11 12-19 20-28 29-37 38-45 46-54 55   Modifier CH CI CJ CK CL CM CN           Medical Necessity:   · Patient is expected to demonstrate progress in strength and pain levels to increase her ability to use the left arm for housework and computer work. Reason for Services/Other Comments:  · Patient continues to require skilled intervention due to ongoing pain in the left chest along pectorals that radiates into the shoulder blades. this pain affects her ability to perform housework and her computer work. Use of outcome tool(s) and clinical judgement create a POC that gives a: Questionable prediction of patient's progress: MODERATE COMPLEXITY            TREATMENT:   (In addition to Assessment/Re-Assessment sessions the following treatments were rendered)  Pre-treatment Symptoms/Complaints:  Pt reporting she overdid it yesterday. She changed linens on her beds and that by the end of the day she had increased pain wrapping around her torso. Pain: Initial:   Pain Intensity 1: 6  Post Session:  5/10     THERAPEUTIC EXERCISE: (30 minutes):  Exercises per grid below to improve mobility and strength. Required minimal verbal cues to promote proper body mechanics.   Progressed resistance, range, repetitions and complexity of movement as indicated. UBE x 5 min  Biceps stretching with arm on doorway 3x hold 20 sec  t-bar overhead stretch x 10 hold 5 sec at top  t-bar ext stretching x 10 hold 10 sec  IR 15# x 20  Rows 20# x 20  Punches 15# x 20-held  Double arm rows 40# x 20  Shoulder pull downs 30# x 20  Prone shoulder ext 2# x 20-held  Prone shoulder rows 4# x 20-held  Prone h. abd with light assistance x 12-held  Prone scap retract to work on scapular depression-mild cuing required-held  Sidelying ER 3# x 20    Manual Therapy (   20 min   ): STM to pectoral region in supine along with some stretching to the pectorals and to left rhomboid region    Therapeutic Modalities:MHP x 10 minutes post treatment for pain relief    HEP: continue as directed  Access Code: [email or web address]   URL: [email or web address]   Date: 04/30/2018   Prepared by: Chip Owens     Exercises   Corner Pec Major Stretch - 5 reps - 1 sets - 2x daily   Standing Bicep Stretch at Wall - 5 reps - 1 sets - 2x daily   Chest and Bicep Stretch - Arms Behind Back - 5 reps - 1 sets - 2x daily   Standing Shoulder Posterior Capsule Stretch - 5 reps - 1 sets - 2x daily   Standing Lower Cervical and Upper Thoracic Stretch - 5 reps - 1 sets - 2x daily    Treatment/Session Assessment:    · Response to Treatment:  Pt reporting some tightness with treatment today. She had trigger point noted along the left rhomboid and close to the thoracic spine region. She reports the pain begins in the pectoral region and when aggravated it wraps around her torso and into the shoulder blade. Pt reporting she does have a history of cervical DDD and this pain may be originating from there. Her treatment is helping overall, but she reports she must be careful to not overdo  It at home. · Compliance with Program/Exercises: Will assess as treatment progresses. · Recommendations/Intent for next treatment session: \"Next visit will focus on advancements to more challenging activities\".   Total Treatment Duration: 60 min  PT Patient Time In/Time Out  Time In: 1115  Time Out: 1215  Treatment number [id number] [facility] Cassius Astudillo PT

## 2018-05-18 ENCOUNTER — HOSPITAL ENCOUNTER (OUTPATIENT)
Dept: PHYSICAL THERAPY | Age: 43
Discharge: HOME | End: 2018-05-18
Payer: COMMERCIAL

## 2018-05-18 PROCEDURE — 97110 THERAPEUTIC EXERCISES: CPT

## 2018-05-18 PROCEDURE — 97140 MANUAL THERAPY 1/> REGIONS: CPT

## 2018-05-18 NOTE — PROGRESS NOTES
Shelli Irvin  : 1975  Payor: Arnoldo Dougherty / Plan: [employer] / Product Type: HMO /  [address]  at [facility]  [address], [address]  [phone]   [phone]         OUTPATIENT PHYSICAL [address] Note 2018    ICD-10: Treatment Diagnosis: pain in thoracic spine M54.6, muscle weakness M62.81,     Precautions/Allergies:   Shellfish derived   Fall Risk Score: 0 (? 5 = High Risk)  MD Orders: Eval and treat MEDICAL/REFERRING DIAGNOSIS:  Pain in thoracic spine [M54.6]  Chest pain on breathing [R07.1]   DATE OF ONSET: 2018  REFERRING PHYSICIAN: Rodolfo Silverio, *  RETURN PHYSICIAN APPOINTMENT: as needed     INITIAL ASSESSMENT:  Ms. Anni Wilson presents with increased left chest pain just inferior to the clavicle and then her pain radiates down into the arm slightly and into the shoulder blades and thoracic spine. She reported no injury to the region and the pain worsened in January that she did undergo ER evaluation which came back negative for any cardiac history. She has also had a follow-up with cardiologist which revealed a slight murmur, but echo and treadmill stress testing were normal.  She is reporting her prescribed NSAID is helping to relieve some of the pain. She continues to report folding laundry, housework and computer work can increase her symptoms. She reports lying down relieves her symptoms. She would like to try therapy to see if her symptoms improve. PROBLEM LIST (Impacting functional limitations):  1. Decreased Strength  2. Decreased ADL/Functional Activities  3. Increased Pain  4. Decreased Activity Tolerance INTERVENTIONS PLANNED:  1. Heat  2. Home Exercise Program (HEP)  3. Manual Therapy  4. Therapeutic Exercise/Strengthening  5. Ultrasound (US)   TREATMENT PLAN:  Effective Dates: 2018 TO 2018 (60 days).  Frequency/Duration: 2 times a week for 60 Days    GOALS: (Goals have been discussed and agreed upon with patient.)  Short-Term Functional Goals: Time Frame: 4 weeks  1. Establish independent HEP with no cueing. 2. Pt will be able to report 5/10 pain rating or less with ADL's.  3. Pt will be able to work on her computer for 20 minutes with less pain. Discharge Goals: Time Frame: 8 weeks  1. Pt will be able to report 3/10 pain rating or less with ADL's.  2. Pt will be able to fold laundry, vacuum up to 20 minutes with no increase in symptoms. 3. Pt will be able to increase strength in bilateral UE's by at least one full grade for improved lifting abilities. Rehabilitation Potential For Stated Goals: Good  Regarding Cami Valentine's therapy, I certify that the treatment plan above will be carried out by a therapist or under their direction. Thank you for this referral,  Shalom Sotelo PT                 The information in this section was collected on 4/30/18 (except where otherwise noted). HISTORY:   History of Present Injury/Illness (Reason for Referral):  Ms. Soy Cortez presents with increased left chest pain just inferior to the clavicle and then her pain radiates down into the arm slightly and into the shoulder blades and thoracic spine. She reported no injury to the region and the pain worsened in January that she did undergo ER evaluation which came back negative for any cardiac history. She has also had a follow-up with cardiologist which revealed a slight murmur, but echo and treadmill stress testing were normal.  She is reporting her prescribed NSAID is helping to relieve some of the pain. She continues to report folding laundry, housework and computer work can increase her symptoms. She reports lying down relieves her symptoms. She would like to try therapy to see if her symptoms improve.   Past Medical History/Comorbidities:   Ms. Soy Cortez  has a past medical history of Dyspepsia and other specified disorders of function of stomach; Irregular heart beat; and Obesity (BMI 30-39.9). She also has no past medical history of Difficult intubation; Malignant hyperthermia due to anesthesia; Nausea & vomiting; Pseudocholinesterase deficiency; or Unspecified adverse effect of anesthesia. Ms. Rey Ward  has a past surgical history that includes hx orthopaedic; hx  section; hx leep procedure; hx tonsillectomy (childhood); and hx cholecystectomy. Social History/Living Environment:     pt lives with supportive family. She has a 8year old sone and 15year old son  Prior Level of Function/Work/Activity:  Pt works part-time as a   Dominant Side:         RIGHT  Current Medications:       Current Outpatient Prescriptions:     meloxicam (MOBIC) 15 mg tablet, Take 1 Tab by mouth daily. , Disp: 30 Tab, Rfl: 1    cyclobenzaprine (FLEXERIL) 10 mg tablet, Take 1 Tab by mouth nightly., Disp: 30 Tab, Rfl: 1    Magnesium Oxide 500 mg cap, Take  by mouth., Disp: , Rfl:    Date Last Reviewed:  2018     Number of Personal Factors/Comorbidities that affect the Plan of Care: 1-2: MODERATE COMPLEXITY   EXAMINATION:   Palpation:          Increased pain just inferior to clavicle around pec major  ROM:        Bilateral UE motion WFL  Bilateral UE ROM WFL  Cervical ROM WFL                                    Strength:     R UE flex 4/5, abd 4-/5, ER 4-/5, IR 4-/5, biceps 4/5         L UE flex 3+/5, abd 3+/5, ER 3+/5, IR 3+/5, biceps 4-/5            Special Tests: negative Josie Pleasure and negative empty can for left shoulder. She tested positive for pain with resisted pectoral testing  Neurological Screen: na  Balance:  na   Body Structures Involved:  1. Muscles  2. Ligaments Body Functions Affected:  1. Sensory/Pain  2. Neuromusculoskeletal  3. Movement Related Activities and Participation Affected:  1. General Tasks and Demands  2. Self Care  3. Domestic Life  4. Interpersonal Interactions and Relationships  5.  Community, Social and [place]   Number of elements (examined above) that affect the Plan of Care: 4+: HIGH COMPLEXITY   CLINICAL PRESENTATION:   Presentation: Evolving clinical presentation with changing clinical characteristics: MODERATE COMPLEXITY   CLINICAL DECISION MAKING:   Outcome Measure: Tool Used: Disabilities of the Arm, Shoulder and Hand (DASH) Questionnaire - Quick Version  Score:  Initial: 26/55  Most Recent: X/55 (Date: -- )   Interpretation of Score: The DASH is designed to measure the activities of daily living in person's with upper extremity dysfunction or pain. Each section is scored on a 1-5 scale, 5 representing the greatest disability. The scores of each section are added together for a total score of 55. Score 11 12-19 20-28 29-37 38-45 46-54 55   Modifier CH CI CJ CK CL CM CN           Medical Necessity:   · Patient is expected to demonstrate progress in strength and pain levels to increase her ability to use the left arm for housework and computer work. Reason for Services/Other Comments:  · Patient continues to require skilled intervention due to ongoing pain in the left chest along pectorals that radiates into the shoulder blades. this pain affects her ability to perform housework and her computer work. Use of outcome tool(s) and clinical judgement create a POC that gives a: Questionable prediction of patient's progress: MODERATE COMPLEXITY            TREATMENT:   (In addition to Assessment/Re-Assessment sessions the following treatments were rendered)  Pre-treatment Symptoms/Complaints:  Pt reporting she is feeling some better today. She did not have to take her muscle relaxer just the NSAID. Pain: Initial:   Pain Intensity 1: 5  Post Session:  5/10     THERAPEUTIC EXERCISE: (30 minutes):  Exercises per grid below to improve mobility and strength. Required minimal verbal cues to promote proper body mechanics. Progressed resistance, range, repetitions and complexity of movement as indicated.   UBE x 5 min  Biceps stretching with arm on doorway 3x hold 20 sec  t-bar overhead stretch x 10 hold 5 sec at top  t-bar ext stretching x 10 hold 10 sec  IR 15# x 20-held  Rows 20# x 20  Punches 15# x 20  Double arm rows 40# x 20  Shoulder pull downs 30# x 20  Prone shoulder ext 2# x 20-  Prone shoulder rows 4# x 20  Prone h. abd with light assistance x 12-held  Prone scap retract to work on scapular depression-mild cuing required  Sidelying ER 3# x 20-held    Manual Therapy (   25 min   ): STM to pectoral region in supine along with some stretching to the pectorals and to left rhomboid region    Therapeutic Modalities:MHP x 10 minutes post treatment for pain relief    HEP: continue as directed  Access Code: [email or web address]   URL: [email or web address]   Date: 04/30/2018   Prepared by: Lizbeth Lambert     Exercises   Corner Pec Major Stretch - 5 reps - 1 sets - 2x daily   Standing Bicep Stretch at Wall - 5 reps - 1 sets - 2x daily   Chest and Bicep Stretch - Arms Behind Back - 5 reps - 1 sets - 2x daily   Standing Shoulder Posterior Capsule Stretch - 5 reps - 1 sets - 2x daily   Standing Lower Cervical and Upper Thoracic Stretch - 5 reps - 1 sets - 2x daily    Treatment/Session Assessment:    · Response to Treatment:  Pt reporting improvements from her last treatment. She has not had to take muscle relaxer, just the NSAID. She does feel some tightening around the scapula with her exercises, but we can stretch it out and perform some trigger point release with improvement. She continues to respond well to the exercises and heat and has not had any wrap around thoracic effects of pain since her last episode. · Compliance with Program/Exercises: Will assess as treatment progresses. · Recommendations/Intent for next treatment session: \"Next visit will focus on advancements to more challenging activities\".   Total Treatment Duration: 65 min  PT Patient Time In/Time Out  Time In: 0945  Time Out: 1050  Treatment number [address] Meghan Diallo, PT

## 2018-05-22 ENCOUNTER — APPOINTMENT (OUTPATIENT)
Dept: PHYSICAL THERAPY | Age: 43
End: 2018-05-22
Payer: COMMERCIAL

## 2018-05-23 ENCOUNTER — HOSPITAL ENCOUNTER (OUTPATIENT)
Dept: PHYSICAL THERAPY | Age: 43
Discharge: HOME | End: 2018-05-23
Payer: COMMERCIAL

## 2018-05-23 PROCEDURE — 97140 MANUAL THERAPY 1/> REGIONS: CPT

## 2018-05-23 PROCEDURE — 97110 THERAPEUTIC EXERCISES: CPT

## 2018-05-30 ENCOUNTER — HOSPITAL ENCOUNTER (OUTPATIENT)
Dept: PHYSICAL THERAPY | Age: 43
Discharge: HOME | End: 2018-05-30
Payer: COMMERCIAL

## 2018-05-30 PROCEDURE — 97110 THERAPEUTIC EXERCISES: CPT

## 2018-05-30 PROCEDURE — 97140 MANUAL THERAPY 1/> REGIONS: CPT

## 2018-05-31 NOTE — PROGRESS NOTES
David Esquivel  : 1975  Payor: Igor Campo / Plan: [address]MO / Product Type: HMO /  [address]  at [place] [address] Therapy  [address], [address]  [phone]   [phone]         OUTPATIENT PHYSICAL THERAPY:Daily Note 2018    ICD-10: Treatment Diagnosis: pain in thoracic spine M54.6, muscle weakness M62.81,     Precautions/Allergies:   Shellfish derived   Fall Risk Score: 0 (? 5 = High Risk)  MD Orders: Eval and treat MEDICAL/REFERRING DIAGNOSIS:  Pain in thoracic spine [M54.6]  Chest pain on breathing [R07.1]   DATE OF ONSET: 2018  REFERRING PHYSICIAN: Margareth Farmer, *  RETURN PHYSICIAN APPOINTMENT: as needed     INITIAL ASSESSMENT:  Ms. Marco Antonio Brady presents with increased left chest pain just inferior to the clavicle and then her pain radiates down into the arm slightly and into the shoulder blades and thoracic spine. She reported no injury to the region and the pain worsened in January that she did undergo ER evaluation which came back negative for any cardiac history. She has also had a follow-up with cardiologist which revealed a slight murmur, but echo and treadmill stress testing were normal.  She is reporting her prescribed NSAID is helping to relieve some of the pain. She continues to report folding laundry, housework and computer work can increase her symptoms. She reports lying down relieves her symptoms. She would like to try therapy to see if her symptoms improve. PROBLEM LIST (Impacting functional limitations):  1. Decreased Strength  2. Decreased ADL/Functional Activities  3. Increased Pain  4. Decreased Activity Tolerance INTERVENTIONS PLANNED:  1. Heat  2. Home Exercise Program (HEP)  3. Manual Therapy  4. Therapeutic Exercise/Strengthening  5. Ultrasound (US)   TREATMENT PLAN:  Effective Dates: 2018 TO 2018 (60 days).  Frequency/Duration: 2 times a week for 60 Days    GOALS: (Goals have been discussed and agreed upon with patient.)  Short-Term Functional Goals: Time Frame: 4 weeks  1. Establish independent HEP with no cueing. 2. Pt will be able to report 5/10 pain rating or less with ADL's.  3. Pt will be able to work on her computer for 20 minutes with less pain. Discharge Goals: Time Frame: 8 weeks  1. Pt will be able to report 3/10 pain rating or less with ADL's.  2. Pt will be able to fold laundry, vacuum up to 20 minutes with no increase in symptoms. 3. Pt will be able to increase strength in bilateral UE's by at least one full grade for improved lifting abilities. Rehabilitation Potential For Stated Goals: Good  Regarding [facility]'s therapy, I certify that the treatment plan above will be carried out by a therapist or under their direction. Thank you for this referral,  Raymond Prather PT                 The information in this section was collected on 4/30/18 (except where otherwise noted). HISTORY:   History of Present Injury/Illness (Reason for Referral):  Ms. Sergo Sheets presents with increased left chest pain just inferior to the clavicle and then her pain radiates down into the arm slightly and into the shoulder blades and thoracic spine. She reported no injury to the region and the pain worsened in January that she did undergo ER evaluation which came back negative for any cardiac history. She has also had a follow-up with cardiologist which revealed a slight murmur, but echo and treadmill stress testing were normal.  She is reporting her prescribed NSAID is helping to relieve some of the pain. She continues to report folding laundry, housework and computer work can increase her symptoms. She reports lying down relieves her symptoms. She would like to try therapy to see if her symptoms improve.   Past Medical History/Comorbidities:   Ms. Sergo Sheets  has a past medical history of Dyspepsia and other specified disorders of function of stomach; Irregular heart beat; and Obesity (BMI 30-39.9). She also has no past medical history of Difficult intubation; Malignant hyperthermia due to anesthesia; Nausea & vomiting; Pseudocholinesterase deficiency; or Unspecified adverse effect of anesthesia. Ms. Jessica Uribe  has a past surgical history that includes hx orthopaedic; hx  section; hx leep procedure; hx tonsillectomy (childhood); and hx cholecystectomy. Social History/Living Environment:     pt lives with supportive family. She has a 8year old sone and 15year old son  Prior Level of Function/Work/Activity:  Pt works part-time as a   Dominant Side:         RIGHT  Current Medications:       Current Outpatient Prescriptions:     meloxicam (MOBIC) 15 mg tablet, Take 1 Tab by mouth daily. , Disp: 30 Tab, Rfl: 1    cyclobenzaprine (FLEXERIL) 10 mg tablet, Take 1 Tab by mouth nightly., Disp: 30 Tab, Rfl: 1    Magnesium Oxide 500 mg cap, Take  by mouth., Disp: , Rfl:    Date Last Reviewed:  2018     Number of Personal Factors/Comorbidities that affect the Plan of Care: 1-2: MODERATE COMPLEXITY   EXAMINATION:   Palpation:          Increased pain just inferior to clavicle around pec major  ROM:        Bilateral UE motion WFL  Bilateral UE ROM WFL  Cervical ROM WFL                                    Strength:     R UE flex 4/5, abd 4-/5, ER 4-/5, IR 4-/5, biceps 4/5         L UE flex 3+/5, abd 3+/5, ER 3+/5, IR 3+/5, biceps 4-/5            Special Tests: negative Kaelyn Iglesias and negative empty can for left shoulder. She tested positive for pain with resisted pectoral testing  Neurological Screen: na  Balance:  na   Body Structures Involved:  1. Muscles  2. Ligaments Body Functions Affected:  1. Sensory/Pain  2. Neuromusculoskeletal  3. Movement Related Activities and Participation Affected:  1. General Tasks and Demands  2. Self Care  3. Domestic Life  4. Interpersonal Interactions and Relationships  5.  Community, Social and [place]   Number of elements (examined above) that affect the Plan of Care: 4+: HIGH COMPLEXITY   CLINICAL PRESENTATION:   Presentation: Evolving clinical presentation with changing clinical characteristics: MODERATE COMPLEXITY   CLINICAL DECISION MAKING:   Outcome Measure: Tool Used: Disabilities of the Arm, Shoulder and Hand (DASH) Questionnaire - Quick Version  Score:  Initial: 26/55  Most Recent: X/55 (Date: -- )   Interpretation of Score: The DASH is designed to measure the activities of daily living in person's with upper extremity dysfunction or pain. Each section is scored on a 1-5 scale, 5 representing the greatest disability. The scores of each section are added together for a total score of 55. Score 11 12-19 20-28 29-37 38-45 46-54 55   Modifier CH CI CJ CK CL CM CN           Medical Necessity:   · Patient is expected to demonstrate progress in strength and pain levels to increase her ability to use the left arm for housework and computer work. Reason for Services/Other Comments:  · Patient continues to require skilled intervention due to ongoing pain in the left chest along pectorals that radiates into the shoulder blades. this pain affects her ability to perform housework and her computer work. Use of outcome tool(s) and clinical judgement create a POC that gives a: Questionable prediction of patient's progress: MODERATE COMPLEXITY            TREATMENT:   (In addition to Assessment/Re-Assessment sessions the following treatments were rendered)  Pre-treatment Symptoms/Complaints:  Pt reporting she will be having a MRI next week on spine. She reported having an increase in pain in her mid to lower back post her last treatment, but the shoulder and pectoral area has improved. Pain: Initial:   Pain Intensity 1: 3  Post Session:  3/10     THERAPEUTIC EXERCISE: (35 minutes):  Exercises per grid below to improve mobility and strength. Required minimal verbal cues to promote proper body mechanics.   Progressed resistance, range, repetitions and complexity of movement as indicated. UBE x 5 min  Biceps stretching with arm on doorway 3x hold 20 sec  t-bar overhead stretch x 10 hold 5 sec at top  t-bar ext stretching x 10 hold 10 sec  IR 15# x 20  Rows 20# x 20  Punches 15# x 20-held  Double arm rows 40# x 20-held  Shoulder pull downs 30# x 20  Prone shoulder ext 2# x 20-held  Prone shoulder rows 4# x 20 - held  Prone h. abd with light assistance x 20 2#-held  Prone scap retract to work on scapular depression-mild cuing required-held  Sidelying ER 3# x 20-held  3 way arm raises on swiss ball x 10-held  Seated on swiss ball with rotation with yellow tubing to bilateral sides x 15  Seated on swiss ball with bilateral rows yellow x 25  Seated on swiss ball with bilateral arm punches x 25  Seated forward trunk stretching on swiss ball x 4   Manual Therapy (   15 min   ): STM to pectoral region in supine along with some stretching to the pectorals and to left rhomboid region    Therapeutic Modalities:MHP x 10 minutes post treatment for pain relief to left shoulder and pectoral region as well as thoracic spine    HEP: continue as directed  Access Code: [email or web address]   URL: [email or web address]   Date: 04/30/2018   Prepared by: Elfego Hernández     Exercises   Corner Pec Major Stretch - 5 reps - 1 sets - 2x daily   Standing Bicep Stretch at Wall - 5 reps - 1 sets - 2x daily   Chest and Bicep Stretch - Arms Behind Back - 5 reps - 1 sets - 2x daily   Standing Shoulder Posterior Capsule Stretch - 5 reps - 1 sets - 2x daily   Standing Lower Cervical and Upper Thoracic Stretch - 5 reps - 1 sets - 2x daily    Treatment/Session Assessment:    · Response to Treatment:  Pt reporting she had increased pain in her thoracic and lower back region. We did add prone arm raises on the swiss ball and this may have caused some of her pain. She reported it did go away gradually.   She continues to feel discomfort in therapy with scap retract work near and around the medial scapular borders on bilateral sides. She improves with forward trunk stretching. She progressed well overall and is scheduled to have MRI next week on spine. She does not report any increased pain at night. · Compliance with Program/Exercises: Will assess as treatment progresses. · Recommendations/Intent for next treatment session: \"Next visit will focus on advancements to more challenging activities\".   Total Treatment Duration: 60 min  PT Patient Time In/Time Out  Time In: 1110  Time Out: 1210  Treatment number [id number], PT

## 2018-06-01 ENCOUNTER — HOSPITAL ENCOUNTER (OUTPATIENT)
Dept: PHYSICAL THERAPY | Age: 43
Discharge: HOME | End: 2018-06-01
Payer: COMMERCIAL

## 2018-06-01 PROCEDURE — 97110 THERAPEUTIC EXERCISES: CPT

## 2018-06-01 PROCEDURE — 97140 MANUAL THERAPY 1/> REGIONS: CPT

## 2018-06-01 NOTE — PROGRESS NOTES
Anjel Barraza  : 1975  Payor: Sofie Grant / Plan: [address] HMO / Product Type: HMO /  [address] at [facility]  [address], [facility], [address]  [phone]   [phone]         OUTPATIENT PHYSICAL THERAPY:Daily Note and Progress Report 2018    ICD-10: Treatment Diagnosis: pain in thoracic spine M54.6, muscle weakness M62.81,     Precautions/Allergies:   Shellfish derived   Fall Risk Score: 0 (? 5 = High Risk)  MD Orders: Eval and treat MEDICAL/REFERRING DIAGNOSIS:  Pain in thoracic spine [M54.6]  Chest pain on breathing [R07.1]   DATE OF ONSET: 2018  REFERRING PHYSICIAN: Alyssa Peralta, *  RETURN PHYSICIAN APPOINTMENT: as needed     INITIAL ASSESSMENT:  Ms. Lakisha Briseno presents with increased left chest pain just inferior to the clavicle and then her pain radiates down into the arm slightly and into the shoulder blades and thoracic spine. She reported no injury to the region and the pain worsened in January that she did undergo ER evaluation which came back negative for any cardiac history. She has also had a follow-up with cardiologist which revealed a slight murmur, but echo and treadmill stress testing were normal.  She is reporting her prescribed NSAID is helping to relieve some of the pain. She continues to report folding laundry, housework and computer work can increase her symptoms. She reports lying down relieves her symptoms. She would like to try therapy to see if her symptoms improve. PROBLEM LIST (Impacting functional limitations):  1. Decreased Strength  2. Decreased ADL/Functional Activities  3. Increased Pain  4. Decreased Activity Tolerance INTERVENTIONS PLANNED:  1. Heat  2. Home Exercise Program (HEP)  3. Manual Therapy  4. Therapeutic Exercise/Strengthening  5. Ultrasound (US)   TREATMENT PLAN:  Effective Dates: 2018 TO 2018 (60 days).  Frequency/Duration: 2 times a week for 60 Days    GOALS: (Goals have been discussed and agreed upon with patient.)  Short-Term Functional Goals: Time Frame: 4 weeks  1. Establish independent HEP with no cueing.-met  2. Pt will be able to report 5/10 pain rating or less with ADL's.-met  3. Pt will be able to work on her computer for 20 minutes with less pain.-met  Discharge Goals: Time Frame: 8 weeks  1. Pt will be able to report 3/10 pain rating or less with ADL's.-intermittently meeting  2. Pt will be able to fold laundry, vacuum up to 20 minutes with no increase in symptoms.-in progress  3. Pt will be able to increase strength in bilateral UE's by at least one full grade for improved lifting abilities.-in progress  Rehabilitation Potential For Stated Goals: Good  Regarding Erin Valentine's therapy, I certify that the treatment plan above will be carried out by a therapist or under their direction. Thank you for this referral,  Madison Lizama PT                 The information in this section was collected on 4/30/18 (except where otherwise noted). HISTORY:   History of Present Injury/Illness (Reason for Referral):  Ms. Darrin Nash presents with increased left chest pain just inferior to the clavicle and then her pain radiates down into the arm slightly and into the shoulder blades and thoracic spine. She reported no injury to the region and the pain worsened in January that she did undergo ER evaluation which came back negative for any cardiac history. She has also had a follow-up with cardiologist which revealed a slight murmur, but echo and treadmill stress testing were normal.  She is reporting her prescribed NSAID is helping to relieve some of the pain. She continues to report folding laundry, housework and computer work can increase her symptoms. She reports lying down relieves her symptoms. She would like to try therapy to see if her symptoms improve.   Past Medical History/Comorbidities:   Ms. Darrin Nash  has a past medical history of Dyspepsia and other specified disorders of function of stomach; Irregular heart beat; and Obesity (BMI 30-39.9). She also has no past medical history of Difficult intubation; Malignant hyperthermia due to anesthesia; Nausea & vomiting; Pseudocholinesterase deficiency; or Unspecified adverse effect of anesthesia. Ms. Arnie Mcintyre  has a past surgical history that includes hx orthopaedic; hx  section; hx leep procedure; hx tonsillectomy (childhood); and hx cholecystectomy. Social History/Living Environment:     pt lives with supportive family. She has a 8year old sone and 15year old son  Prior Level of Function/Work/Activity:  Pt works part-time as a   Dominant Side:         RIGHT  Current Medications:       Current Outpatient Prescriptions:     meloxicam (MOBIC) 15 mg tablet, Take 1 Tab by mouth daily. , Disp: 30 Tab, Rfl: 1    cyclobenzaprine (FLEXERIL) 10 mg tablet, Take 1 Tab by mouth nightly., Disp: 30 Tab, Rfl: 1    Magnesium Oxide 500 mg cap, Take  by mouth., Disp: , Rfl:    Date Last Reviewed:  2018     Number of Personal Factors/Comorbidities that affect the Plan of Care: 1-2: MODERATE COMPLEXITY   EXAMINATION:   Palpation:          Increased pain just inferior to clavicle around pec major  ROM:        Bilateral UE motion WFL  Bilateral UE ROM WFL  Cervical ROM WFL                                    Strength:     R UE flex 4/5, abd 4-/5, ER 4-/5, IR 4-/5, biceps 4/5         L UE flex 3+/5, abd 3+/5, ER 3+/5, IR 3+/5, biceps 4-/5            Special Tests: negative Jennye Mina and negative empty can for left shoulder. She tested positive for pain with resisted pectoral testing  Neurological Screen: na  Balance:  na   Body Structures Involved:  1. Muscles  2. Ligaments Body Functions Affected:  1. Sensory/Pain  2. Neuromusculoskeletal  3. Movement Related Activities and Participation Affected:  1. General Tasks and Demands  2. Self Care  3. Domestic Life  4.  Interpersonal Interactions and Relationships  5. Community, Social and [place]   Number of elements (examined above) that affect the Plan of Care: 4+: HIGH COMPLEXITY   CLINICAL PRESENTATION:   Presentation: Evolving clinical presentation with changing clinical characteristics: MODERATE COMPLEXITY   CLINICAL DECISION MAKING:   Outcome Measure: Tool Used: Disabilities of the Arm, Shoulder and Hand (DASH) Questionnaire - Quick Version  Score:  Initial: 26/55  Most Recent: 23/55 (Date: 6/1/18 )   Interpretation of Score: The DASH is designed to measure the activities of daily living in person's with upper extremity dysfunction or pain. Each section is scored on a 1-5 scale, 5 representing the greatest disability. The scores of each section are added together for a total score of 55. Score 11 12-19 20-28 29-37 38-45 46-54 55   Modifier CH CI CJ CK CL CM CN           Medical Necessity:   · Patient is expected to demonstrate progress in strength and pain levels to increase her ability to use the left arm for housework and computer work. Reason for Services/Other Comments:  · Patient continues to require skilled intervention due to ongoing pain in the left chest along pectorals that radiates into the shoulder blades. this pain affects her ability to perform housework and her computer work. Use of outcome tool(s) and clinical judgement create a POC that gives a: Questionable prediction of patient's progress: MODERATE COMPLEXITY            TREATMENT:   (In addition to Assessment/Re-Assessment sessions the following treatments were rendered)  Pre-treatment Symptoms/Complaints:  Pt reporting she is feeling better. She was sore post last treatment, but it was gone the following day. Pain: Initial:   Pain Intensity 1: 3  Post Session:  3/10     THERAPEUTIC EXERCISE: (35 minutes):  Exercises per grid below to improve mobility and strength. Required minimal verbal cues to promote proper body mechanics.   Progressed resistance, range, repetitions and complexity of movement as indicated. UBE x 5 min  Biceps stretching with arm on doorway 3x hold 20 sec  t-bar overhead stretch x 10 hold 5 sec at top  t-bar ext stretching x 10 hold 10 sec  IR 15# x 20  Rows 20# x 20  Punches 15# x 20-held  Double arm rows 40# x 20  Shoulder pull downs 40# x 20  Prone shoulder ext 4# x 20  Prone shoulder rows 4# x 20 - held  Prone h. abd with light assistance x 20 2#-held  Prone scap retract to work on scapular depression-mild cuing required x 15  Sidelying ER 3# x 20-held  3 way arm raises on swiss ball x 10-held  Seated on swiss ball with rotation with yellow tubing to bilateral sides x 15  Seated on swiss ball with bilateral rows yellow x 25  Seated on swiss ball with bilateral arm punches x 25  Seated forward trunk stretching on swiss ball x 4 -held  Bridging x 20  Manual Therapy (   15 min   ): STM to pectoral region in supine along with some stretching to the pectorals and to left rhomboid region    Therapeutic Modalities:MHP x 10 minutes post treatment for pain relief to left shoulder and pectoral region as well as thoracic spine    HEP: continue as directed  Access Code: [email or web address]   URL: [email or web address]   Date: 04/30/2018   Prepared by: Peyton Fried     Exercises   Corner Pec Major Stretch - 5 reps - 1 sets - 2x daily   Standing Bicep Stretch at Wall - 5 reps - 1 sets - 2x daily   Chest and Bicep Stretch - Arms Behind Back - 5 reps - 1 sets - 2x daily   Standing Shoulder Posterior Capsule Stretch - 5 reps - 1 sets - 2x daily   Standing Lower Cervical and Upper Thoracic Stretch - 5 reps - 1 sets - 2x daily    Treatment/Session Assessment:    · Response to Treatment:  Pt reporting she continues to take NSAId, but has not needed her muscle relaxer. She is progressing well with exercises and at times feels a trigger point on the left medial scapular border. With pressure to it, it can increase her pectoral pain.   Overall she reports feeling better, but is going for MRI next week and she is still struggling with pain increases and has not been able to perform her normal  Housework. She has improved strength by 1/2 grade and has full ROM in cervical spine, thoracic spine and lumbar spine. · Compliance with Program/Exercises: Will assess as treatment progresses. · Recommendations/Intent for next treatment session: \"Next visit will focus on advancements to more challenging activities\".   Total Treatment Duration: 60 min  PT Patient Time In/Time Out  Time In: 1030  Time Out: 1130  Treatment number [address] [place] [address],

## 2018-06-04 ENCOUNTER — HOSPITAL ENCOUNTER (OUTPATIENT)
Dept: PHYSICAL THERAPY | Age: 43
Discharge: HOME | End: 2018-06-04
Payer: COMMERCIAL

## 2018-06-04 NOTE — PROGRESS NOTES
Therapy Center at [address]   [address] [place] [address], [facility] [address]  [phone]   [phone]    OUTPATIENT DAILY NOTE    NAME/AGE/GENDER: Stefany Palafox is a 44 y.o. female. DATE: 6/4/2018    Patient cancelled appointment today. Will plan to follow up on next scheduled visit.     Toyin Wong, PT

## 2018-06-05 ENCOUNTER — HOSPITAL ENCOUNTER (OUTPATIENT)
Dept: MRI IMAGING | Age: 43
Discharge: HOME | End: 2018-06-05
Attending: NURSE PRACTITIONER
Payer: COMMERCIAL

## 2018-06-05 DIAGNOSIS — M54.6 THORACIC SPINE PAIN: ICD-10-CM

## 2018-06-05 PROCEDURE — 72146 MRI CHEST SPINE W/O DYE: CPT

## 2018-06-06 PROBLEM — M51.36 DDD (DEGENERATIVE DISC DISEASE), LUMBAR: Status: ACTIVE | Noted: 2018-06-06

## 2018-06-06 PROBLEM — M51.34 DDD (DEGENERATIVE DISC DISEASE), THORACIC: Status: ACTIVE | Noted: 2018-06-06

## 2018-06-06 NOTE — PROGRESS NOTES
Mild multilevel degenerative disk disease of middle and lower spine. There is mild disc bulging but no significant stenosis. Suggest seeing [address] for facet injections.

## 2018-06-07 ENCOUNTER — APPOINTMENT (OUTPATIENT)
Dept: PHYSICAL THERAPY | Age: 43
End: 2018-06-07
Payer: COMMERCIAL

## 2018-06-12 ENCOUNTER — HOSPITAL ENCOUNTER (OUTPATIENT)
Dept: PHYSICAL THERAPY | Age: 43
Discharge: HOME | End: 2018-06-12
Payer: COMMERCIAL

## 2018-06-12 PROCEDURE — 97110 THERAPEUTIC EXERCISES: CPT

## 2018-06-12 NOTE — PROGRESS NOTES
Mirza Kwok  : 1975  Payor: Tiffanie Donny / Plan: [address] HMO / Product Type: HMO /  [address] at [place] [address] [facility]  [address], [address]  [phone]   [phone]         OUTPATIENT PHYSICAL [address] Note 2018    ICD-10: Treatment Diagnosis: pain in thoracic spine M54.6, muscle weakness M62.81,     Precautions/Allergies:   Shellfish derived   Fall Risk Score: 0 (? 5 = High Risk)  MD Orders: Eval and treat MEDICAL/REFERRING DIAGNOSIS:  Pain in thoracic spine [M54.6]  Chest pain on breathing [R07.1]   DATE OF ONSET: 2018  REFERRING PHYSICIAN: Marina Herrera, Lien Garcia, *  RETURN PHYSICIAN APPOINTMENT: as needed     INITIAL ASSESSMENT:  Ms. Leroy Swan presents with increased left chest pain just inferior to the clavicle and then her pain radiates down into the arm slightly and into the shoulder blades and thoracic spine. She reported no injury to the region and the pain worsened in January that she did undergo ER evaluation which came back negative for any cardiac history. She has also had a follow-up with cardiologist which revealed a slight murmur, but echo and treadmill stress testing were normal.  She is reporting her prescribed NSAID is helping to relieve some of the pain. She continues to report folding laundry, housework and computer work can increase her symptoms. She reports lying down relieves her symptoms. She would like to try therapy to see if her symptoms improve. PROBLEM LIST (Impacting functional limitations):  1. Decreased Strength  2. Decreased ADL/Functional Activities  3. Increased Pain  4. Decreased Activity Tolerance INTERVENTIONS PLANNED:  1. Heat  2. Home Exercise Program (HEP)  3. Manual Therapy  4. Therapeutic Exercise/Strengthening  5. Ultrasound (US)   TREATMENT PLAN:  Effective Dates: 2018 TO 2018 (60 days).  Frequency/Duration: 2 times a week for 60 Days    GOALS: (Goals have been discussed and agreed upon with patient.)  Short-Term Functional Goals: Time Frame: 4 weeks  1. Establish independent HEP with no cueing.-met  2. Pt will be able to report 5/10 pain rating or less with ADL's.-met  3. Pt will be able to work on her computer for 20 minutes with less pain.-met  Discharge Goals: Time Frame: 8 weeks  1. Pt will be able to report 3/10 pain rating or less with ADL's.-intermittently meeting  2. Pt will be able to fold laundry, vacuum up to 20 minutes with no increase in symptoms.-in progress  3. Pt will be able to increase strength in bilateral UE's by at least one full grade for improved lifting abilities.-in progress  Rehabilitation Potential For Stated Goals: Good  Regarding Wily Valentine's therapy, I certify that the treatment plan above will be carried out by a therapist or under their direction. Thank you for this referral,  Carlo Ritchie PT                 The information in this section was collected on 4/30/18 (except where otherwise noted). HISTORY:   History of Present Injury/Illness (Reason for Referral):  Ms. Katlyn Camilo presents with increased left chest pain just inferior to the clavicle and then her pain radiates down into the arm slightly and into the shoulder blades and thoracic spine. She reported no injury to the region and the pain worsened in January that she did undergo ER evaluation which came back negative for any cardiac history. She has also had a follow-up with cardiologist which revealed a slight murmur, but echo and treadmill stress testing were normal.  She is reporting her prescribed NSAID is helping to relieve some of the pain. She continues to report folding laundry, housework and computer work can increase her symptoms. She reports lying down relieves her symptoms. She would like to try therapy to see if her symptoms improve.   Past Medical History/Comorbidities:   Ms. Katlyn Camilo  has a past medical history of Dyspepsia and other specified disorders of function of stomach; Irregular heart beat; and Obesity (BMI 30-39.9). She also has no past medical history of Difficult intubation; Malignant hyperthermia due to anesthesia; Nausea & vomiting; Pseudocholinesterase deficiency; or Unspecified adverse effect of anesthesia. Ms. Cody Diallo  has a past surgical history that includes hx orthopaedic; hx  section; hx leep procedure; hx tonsillectomy (childhood); and hx cholecystectomy. Social History/Living Environment:     pt lives with supportive family. She has a 8year old sone and 15year old son  Prior Level of Function/Work/Activity:  Pt works part-time as a   Dominant Side:         RIGHT  Current Medications:       Current Outpatient Prescriptions:     meloxicam (MOBIC) 15 mg tablet, Take 1 Tab by mouth daily. , Disp: 30 Tab, Rfl: 1    cyclobenzaprine (FLEXERIL) 10 mg tablet, Take 1 Tab by mouth nightly., Disp: 30 Tab, Rfl: 1    Magnesium Oxide 500 mg cap, Take  by mouth., Disp: , Rfl:    Date Last Reviewed:  2018     Number of Personal Factors/Comorbidities that affect the Plan of Care: 1-2: MODERATE COMPLEXITY   EXAMINATION:   Palpation:          Increased pain just inferior to clavicle around pec major  ROM:        Bilateral UE motion WFL  Bilateral UE ROM WFL  Cervical ROM WFL                                    Strength:     R UE flex 4/5, abd 4-/5, ER 4-/5, IR 4-/5, biceps 4/5         L UE flex 3+/5, abd 3+/5, ER 3+/5, IR 3+/5, biceps 4-/5            Special Tests: negative Adrian Pedro and negative empty can for left shoulder. She tested positive for pain with resisted pectoral testing  Neurological Screen: na  Balance:  na   Body Structures Involved:  1. Muscles  2. Ligaments Body Functions Affected:  1. Sensory/Pain  2. Neuromusculoskeletal  3. Movement Related Activities and Participation Affected:  1. General Tasks and Demands  2. Self Care  3. Domestic Life  4.  Interpersonal Interactions and Relationships  5. Community, Social and [place]   Number of elements (examined above) that affect the Plan of Care: 4+: HIGH COMPLEXITY   CLINICAL PRESENTATION:   Presentation: Evolving clinical presentation with changing clinical characteristics: MODERATE COMPLEXITY   CLINICAL DECISION MAKING:   Outcome Measure: Tool Used: Disabilities of the Arm, Shoulder and Hand (DASH) Questionnaire - Quick Version  Score:  Initial: 26/55  Most Recent: 23/55 (Date: 6/1/18 )   Interpretation of Score: The DASH is designed to measure the activities of daily living in person's with upper extremity dysfunction or pain. Each section is scored on a 1-5 scale, 5 representing the greatest disability. The scores of each section are added together for a total score of 55. Score 11 12-19 20-28 29-37 38-45 46-54 55   Modifier CH CI CJ CK CL CM CN           Medical Necessity:   · Patient is expected to demonstrate progress in strength and pain levels to increase her ability to use the left arm for housework and computer work. Reason for Services/Other Comments:  · Patient continues to require skilled intervention due to ongoing pain in the left chest along pectorals that radiates into the shoulder blades. this pain affects her ability to perform housework and her computer work. Use of outcome tool(s) and clinical judgement create a POC that gives a: Questionable prediction of patient's progress: MODERATE COMPLEXITY            TREATMENT:   (In addition to Assessment/Re-Assessment sessions the following treatments were rendered)  Pre-treatment Symptoms/Complaints:  Pt reporting she is feeling better in the pectoral area as she is wearing her brace more with her computer work. The mid back is still sore. Pain: Initial:   Pain Intensity 1: 3  Post Session:  3/10     THERAPEUTIC EXERCISE: (45 minutes):  Exercises per grid below to improve mobility and strength. Required minimal verbal cues to promote proper body mechanics. Progressed resistance, range, repetitions and complexity of movement as indicated. Nu-step level 2 x 6 min  Biceps stretching with arm on doorway 3x hold 20 sec  t-bar overhead stretch x 10 hold 5 sec at top-held  t-bar ext stretching x 10 hold 10 sec-held  IR 15# x 20-held  Rows 20# x 20  Punches 15# x 20-held  Double arm rows 40# x 25  Shoulder pull downs 40# x 25  Prone shoulder ext 4# x 20-held  Prone shoulder rows 4# x 20 - held  Prone h. abd with light assistance x 20 2#-held  Prone scap retract to work on scapular depression-mild cuing required x 15  Sidelying ER 3# x 20-held  3 way arm raises on swiss ball x 10-held  Seated on swiss ball with rotation with green tubing to bilateral sides x 20  Seated on swiss ball with bilateral rows yellow x 25  Seated on swiss ball with bilateral arm punches x 25  Seated forward trunk stretching on swiss ball x 4 -held  Bridging x 20  LTR x 20  Knee to chest x 5 hold 15 sec  Prayer stretch on table 3 hold 20 sec  Arm rows underhand  with mini-squat position  Seated on swiss ball marching x 20  Seated on swiss ball LAQ x 20  Seated on swiss ball with overhead ball raises 6# medball x 15  Manual Therapy (   0 min   ): na    Therapeutic Modalities:MHP x 10 minutes post treatment for pain relief to left shoulder and pectoral region as well as thoracic spine    HEP: continue as directed  Access Code: [email or web address]   URL: [email or web address]   Date: 04/30/2018   Prepared by: Belkis Velez     Exercises   Corner Pec Major Stretch - 5 reps - 1 sets - 2x daily   Standing Bicep Stretch at Wall - 5 reps - 1 sets - 2x daily   Chest and Bicep Stretch - Arms Behind Back - 5 reps - 1 sets - 2x daily   Standing Shoulder Posterior Capsule Stretch - 5 reps - 1 sets - 2x daily   Standing Lower Cervical and Upper Thoracic Stretch - 5 reps - 1 sets - 2x daily    Treatment/Session Assessment:    · Response to Treatment:  Pt underwent MRI which showed multi-level DDD in the spine. She reports the brace is helping her pectoral region, but the mid back is still sore and painful at times. She progressed well, but will perform postural strengthening exercises and then need to stretch as the mid back tightens. Pt received heat post treatment and continues to report it helps. · Compliance with Program/Exercises: Will assess as treatment progresses. · Recommendations/Intent for next treatment session: \"Next visit will focus on advancements to more challenging activities\".   Total Treatment Duration: 55 min  PT Patient Time In/Time Out  Time In: 1130  Time Out: 8365  Treatment number [id number],

## 2018-06-25 ENCOUNTER — HOSPITAL ENCOUNTER (OUTPATIENT)
Dept: PHYSICAL THERAPY | Age: 43
Discharge: HOME | End: 2018-06-25
Payer: COMMERCIAL

## 2018-06-25 PROCEDURE — 97140 MANUAL THERAPY 1/> REGIONS: CPT

## 2018-06-25 PROCEDURE — 97110 THERAPEUTIC EXERCISES: CPT

## 2018-06-25 NOTE — PROGRESS NOTES
Marilyn Ward  : 1975  Payor: Mandy Salazar / Plan: [address] HMO / Product Type: HMO /  [address] at [facility]  [address], [facility], [address]  [phone]   [phone]         OUTPATIENT PHYSICAL THERAPY:Daily Note 2018    ICD-10: Treatment Diagnosis: pain in thoracic spine M54.6, muscle weakness M62.81,     Precautions/Allergies:   Shellfish derived   Fall Risk Score: 0 (? 5 = High Risk)  MD Orders: Eval and treat MEDICAL/REFERRING DIAGNOSIS:  Pain in thoracic spine [M54.6]  Chest pain on breathing [R07.1]   DATE OF ONSET: 2018  REFERRING PHYSICIAN: Julieta Buck, *  RETURN PHYSICIAN APPOINTMENT: as needed     INITIAL ASSESSMENT:  Ms. David Cerda presents with increased left chest pain just inferior to the clavicle and then her pain radiates down into the arm slightly and into the shoulder blades and thoracic spine. She reported no injury to the region and the pain worsened in January that she did undergo ER evaluation which came back negative for any cardiac history. She has also had a follow-up with cardiologist which revealed a slight murmur, but echo and treadmill stress testing were normal.  She is reporting her prescribed NSAID is helping to relieve some of the pain. She continues to report folding laundry, housework and computer work can increase her symptoms. She reports lying down relieves her symptoms. She would like to try therapy to see if her symptoms improve. PROBLEM LIST (Impacting functional limitations):  1. Decreased Strength  2. Decreased ADL/Functional Activities  3. Increased Pain  4. Decreased Activity Tolerance INTERVENTIONS PLANNED:  1. Heat  2. Home Exercise Program (HEP)  3. Manual Therapy  4. Therapeutic Exercise/Strengthening  5. Ultrasound (US)   TREATMENT PLAN:  Effective Dates: 2018 TO 2018 (60 days).  Frequency/Duration: 2 times a week for 60 Days    GOALS: (Goals have been discussed and agreed upon with patient.)  Short-Term Functional Goals: Time Frame: 4 weeks  1. Establish independent HEP with no cueing.-met  2. Pt will be able to report 5/10 pain rating or less with ADL's.-met  3. Pt will be able to work on her computer for 20 minutes with less pain.-met  Discharge Goals: Time Frame: 8 weeks  1. Pt will be able to report 3/10 pain rating or less with ADL's.-intermittently meeting  2. Pt will be able to fold laundry, vacuum up to 20 minutes with no increase in symptoms.-in progress  3. Pt will be able to increase strength in bilateral UE's by at least one full grade for improved lifting abilities.-in progress  Rehabilitation Potential For Stated Goals: Good  Regarding Jordan Valentine's therapy, I certify that the treatment plan above will be carried out by a therapist or under their direction. Thank you for this referral,  Sarwat Gutierrez, PT                 The information in this section was collected on 4/30/18 (except where otherwise noted). HISTORY:   History of Present Injury/Illness (Reason for Referral):  Ms. Carlo Hernandez presents with increased left chest pain just inferior to the clavicle and then her pain radiates down into the arm slightly and into the shoulder blades and thoracic spine. She reported no injury to the region and the pain worsened in January that she did undergo ER evaluation which came back negative for any cardiac history. She has also had a follow-up with cardiologist which revealed a slight murmur, but echo and treadmill stress testing were normal.  She is reporting her prescribed NSAID is helping to relieve some of the pain. She continues to report folding laundry, housework and computer work can increase her symptoms. She reports lying down relieves her symptoms. She would like to try therapy to see if her symptoms improve.   Past Medical History/Comorbidities:   Ms. Carlo Hernandez  has a past medical history of Dyspepsia and other specified disorders of function of stomach; Irregular heart beat; and Obesity (BMI 30-39.9). She also has no past medical history of Difficult intubation; Malignant hyperthermia due to anesthesia; Nausea & vomiting; Pseudocholinesterase deficiency; or Unspecified adverse effect of anesthesia. Ms. Sergo Sheets  has a past surgical history that includes hx orthopaedic; hx  section; hx leep procedure; hx tonsillectomy (childhood); and hx cholecystectomy. Social History/Living Environment:     pt lives with supportive family. She has a 8year old sone and 15year old son  Prior Level of Function/Work/Activity:  Pt works part-time as a   Dominant Side:         RIGHT  Current Medications:       Current Outpatient Prescriptions:     meloxicam (MOBIC) 15 mg tablet, Take 1 Tab by mouth daily. , Disp: 30 Tab, Rfl: 1    cyclobenzaprine (FLEXERIL) 10 mg tablet, Take 1 Tab by mouth nightly., Disp: 30 Tab, Rfl: 1    Magnesium Oxide 500 mg cap, Take  by mouth., Disp: , Rfl:    Date Last Reviewed:  2018     Number of Personal Factors/Comorbidities that affect the Plan of Care: 1-2: MODERATE COMPLEXITY   EXAMINATION:   Palpation:          Increased pain just inferior to clavicle around pec major  ROM:        Bilateral UE motion WFL  Bilateral UE ROM WFL  Cervical ROM WFL                                    Strength:     R UE flex 4/5, abd 4-/5, ER 4-/5, IR 4-/5, biceps 4/5         L UE flex 3+/5, abd 3+/5, ER 3+/5, IR 3+/5, biceps 4-/5            Special Tests: negative Elizabethann Jewel and negative empty can for left shoulder. She tested positive for pain with resisted pectoral testing  Neurological Screen: na  Balance:  na   Body Structures Involved:  1. Muscles  2. Ligaments Body Functions Affected:  1. Sensory/Pain  2. Neuromusculoskeletal  3. Movement Related Activities and Participation Affected:  1. General Tasks and Demands  2. Self Care  3. Domestic Life  4.  Interpersonal Interactions and Relationships  5. Community, Social and [place]   Number of elements (examined above) that affect the Plan of Care: 4+: HIGH COMPLEXITY   CLINICAL PRESENTATION:   Presentation: Evolving clinical presentation with changing clinical characteristics: MODERATE COMPLEXITY   CLINICAL DECISION MAKING:   Outcome Measure: Tool Used: Disabilities of the Arm, Shoulder and Hand (DASH) Questionnaire - Quick Version  Score:  Initial: 26/55  Most Recent: 23/55 (Date: 6/1/18 )   Interpretation of Score: The DASH is designed to measure the activities of daily living in person's with upper extremity dysfunction or pain. Each section is scored on a 1-5 scale, 5 representing the greatest disability. The scores of each section are added together for a total score of 55. Score 11 12-19 20-28 29-37 38-45 46-54 55   Modifier CH CI CJ CK CL CM CN           Medical Necessity:   · Patient is expected to demonstrate progress in strength and pain levels to increase her ability to use the left arm for housework and computer work. Reason for Services/Other Comments:  · Patient continues to require skilled intervention due to ongoing pain in the left chest along pectorals that radiates into the shoulder blades. this pain affects her ability to perform housework and her computer work. Use of outcome tool(s) and clinical judgement create a POC that gives a: Questionable prediction of patient's progress: MODERATE COMPLEXITY            TREATMENT:   (In addition to Assessment/Re-Assessment sessions the following treatments were rendered)  Pre-treatment Symptoms/Complaints:  Pt reporting she was out of town last week. She did a lot of work at her parents house since her mother was ill and she did flare up her mid back region. Pain: Initial:   Pain Intensity 1: 3  Post Session:  3/10     THERAPEUTIC EXERCISE: (40 minutes):  Exercises per grid below to improve mobility and strength.   Required minimal verbal cues to promote proper body mechanics. Progressed resistance, range, repetitions and complexity of movement as indicated. Nu-step level 2 x 6 min-held  Upper arm bike x 6 min  Biceps stretching with arm on doorway 3x hold 20 sec  t-bar overhead stretch x 10 hold 5 sec at top-  t-bar ext stretching x 10 hold 10 sec-  IR 15# x 20-held  Rows 20# x 20  Punches 15# x 20-held  Double arm rows 45# x 25  Shoulder pull downs 45# x 25  Prone shoulder ext 4# x 20-held  Prone shoulder rows 4# x 20 - held  Prone h. abd with light assistance x 20 2#-held  Prone scap retract to work on scapular depression-mild cuing required x 15-held  Sidelying ER 3# x 20-held  3 way arm raises on swiss ball x 10-held  Seated on swiss ball with rotation with green tubing to bilateral sides x 20-held  Seated on swiss ball with bilateral rows yellow x 25-held  Seated on swiss ball with bilateral arm punches x 25-held  Seated forward trunk stretching on swiss ball x 4 -held  Bridging x 20  LTR x 20  Knee to chest x 5 hold 15 sec-held  Prayer stretch on table 3 hold 20 sec  Arm rows underhand  with mini-squat position 45# x 20  Seated on swiss ball marching x 20-held  Seated on swiss ball LAQ x 20-held  Seated on swiss ball with overhead ball raises 6# medball x 15-held  Three way arm raises to 90 x 10  Hor abd. Adduction with red TB x 20  Manual Therapy (   10 min min   ): na    Therapeutic Modalities:MHP x 10 minutes post treatment for pain relief to left shoulder and pectoral region as well as thoracic spine    HEP: continue as directed  Access Code: [email or web address]   URL: [email or web address]   Date: 04/30/2018   Prepared by: Parrish Gordon     Exercises   Corner Pec Major Stretch - 5 reps - 1 sets - 2x daily   Standing Bicep Stretch at Wall - 5 reps - 1 sets - 2x daily   Chest and Bicep Stretch - Arms Behind Back - 5 reps - 1 sets - 2x daily   Standing Shoulder Posterior Capsule Stretch - 5 reps - 1 sets - 2x daily   Standing Lower Cervical and Upper Thoracic Stretch - 5 reps - 1 sets - 2x daily    Treatment/Session Assessment:    · Response to Treatment:  Pt reporting she overdid it at her mother's home. She reported her mom was sick and she had to assist her with household tasks. She progressed well today with therapy and did have some minor tightness around the left scapular border. Performed trigger point release to this region with some relief noted. She will be seeing MD today at [address] [place] [address] for further consultation. · Compliance with Program/Exercises: Will assess as treatment progresses. · Recommendations/Intent for next treatment session: \"Next visit will focus on advancements to more challenging activities\".   Total Treatment Duration: 60 min  PT Patient Time In/Time Out  Time In: 1115  Time Out: 1215  Treatment number [id number] Krystyna Montero PT

## 2018-06-28 ENCOUNTER — HOSPITAL ENCOUNTER (OUTPATIENT)
Dept: PHYSICAL THERAPY | Age: 43
Discharge: HOME | End: 2018-06-28
Payer: COMMERCIAL

## 2018-06-28 PROCEDURE — 97140 MANUAL THERAPY 1/> REGIONS: CPT

## 2018-06-28 PROCEDURE — 97110 THERAPEUTIC EXERCISES: CPT

## 2018-06-28 NOTE — PROGRESS NOTES
Jose A Elizabeth  : 1975  Payor: Jez Borja / Plan: [address] HMO / Product Type: HMO /  [facility] at [place] [address] Therapy  [address], [address]  [phone]   [phone]         OUTPATIENT PHYSICAL THERAPY:Daily Note 2018    ICD-10: Treatment Diagnosis: pain in thoracic spine M54.6, muscle weakness M62.81,     Precautions/Allergies:   Shellfish derived   Fall Risk Score: 0 (? 5 = High Risk)  MD Orders: Eval and treat MEDICAL/REFERRING DIAGNOSIS:  Pain in thoracic spine [M54.6]  Chest pain on breathing [R07.1]   DATE OF ONSET: 2018  REFERRING PHYSICIAN: Ryan Farmer, *  RETURN PHYSICIAN APPOINTMENT: as needed     INITIAL ASSESSMENT:  Ms. Lily Kirk presents with increased left chest pain just inferior to the clavicle and then her pain radiates down into the arm slightly and into the shoulder blades and thoracic spine. She reported no injury to the region and the pain worsened in January that she did undergo ER evaluation which came back negative for any cardiac history. She has also had a follow-up with cardiologist which revealed a slight murmur, but echo and treadmill stress testing were normal.  She is reporting her prescribed NSAID is helping to relieve some of the pain. She continues to report folding laundry, housework and computer work can increase her symptoms. She reports lying down relieves her symptoms. She would like to try therapy to see if her symptoms improve. PROBLEM LIST (Impacting functional limitations):  1. Decreased Strength  2. Decreased ADL/Functional Activities  3. Increased Pain  4. Decreased Activity Tolerance INTERVENTIONS PLANNED:  1. Heat  2. Home Exercise Program (HEP)  3. Manual Therapy  4. Therapeutic Exercise/Strengthening  5. Ultrasound (US)   TREATMENT PLAN:  Effective Dates: 2018 TO 2018 (60 days).  Frequency/Duration: 2 times a week for 60 Days    GOALS: (Goals have been discussed and agreed upon with patient.)  Short-Term Functional Goals: Time Frame: 4 weeks  1. Establish independent HEP with no cueing.-met  2. Pt will be able to report 5/10 pain rating or less with ADL's.-met  3. Pt will be able to work on her computer for 20 minutes with less pain.-met  Discharge Goals: Time Frame: 8 weeks  1. Pt will be able to report 3/10 pain rating or less with ADL's.-intermittently meeting  2. Pt will be able to fold laundry, vacuum up to 20 minutes with no increase in symptoms.-in progress  3. Pt will be able to increase strength in bilateral UE's by at least one full grade for improved lifting abilities.-in progress  Rehabilitation Potential For Stated Goals: Good  Regarding Sandy Valentine's therapy, I certify that the treatment plan above will be carried out by a therapist or under their direction. Thank you for this referral,  Tisha Blankenship PT                 The information in this section was collected on 4/30/18 (except where otherwise noted). HISTORY:   History of Present Injury/Illness (Reason for Referral):  Ms. Neal Gandraa presents with increased left chest pain just inferior to the clavicle and then her pain radiates down into the arm slightly and into the shoulder blades and thoracic spine. She reported no injury to the region and the pain worsened in January that she did undergo ER evaluation which came back negative for any cardiac history. She has also had a follow-up with cardiologist which revealed a slight murmur, but echo and treadmill stress testing were normal.  She is reporting her prescribed NSAID is helping to relieve some of the pain. She continues to report folding laundry, housework and computer work can increase her symptoms. She reports lying down relieves her symptoms. She would like to try therapy to see if her symptoms improve.   Past Medical History/Comorbidities:   Ms. Neal Gandara  has a past medical history of Dyspepsia and other specified disorders of function of stomach; Irregular heart beat; and Obesity (BMI 30-39.9). She also has no past medical history of Difficult intubation; Malignant hyperthermia due to anesthesia; Nausea & vomiting; Pseudocholinesterase deficiency; or Unspecified adverse effect of anesthesia. Ms. Chel Mayes  has a past surgical history that includes hx orthopaedic; hx  section; hx leep procedure; hx tonsillectomy (childhood); and hx cholecystectomy. Social History/Living Environment:     pt lives with supportive family. She has a 8year old sone and 15year old son  Prior Level of Function/Work/Activity:  Pt works part-time as a   Dominant Side:         RIGHT  Current Medications:       Current Outpatient Prescriptions:     meloxicam (MOBIC) 15 mg tablet, Take 1 Tab by mouth daily. , Disp: 30 Tab, Rfl: 1    cyclobenzaprine (FLEXERIL) 10 mg tablet, Take 1 Tab by mouth nightly., Disp: 30 Tab, Rfl: 1    Magnesium Oxide 500 mg cap, Take  by mouth., Disp: , Rfl:    Date Last Reviewed:  2018     Number of Personal Factors/Comorbidities that affect the Plan of Care: 1-2: MODERATE COMPLEXITY   EXAMINATION:   Palpation:          Increased pain just inferior to clavicle around pec major  ROM:        Bilateral UE motion WFL  Bilateral UE ROM WFL  Cervical ROM WFL                                    Strength:     R UE flex 4/5, abd 4-/5, ER 4-/5, IR 4-/5, biceps 4/5         L UE flex 3+/5, abd 3+/5, ER 3+/5, IR 3+/5, biceps 4-/5            Special Tests: negative Lucas Sands and negative empty can for left shoulder. She tested positive for pain with resisted pectoral testing  Neurological Screen: na  Balance:  na   Body Structures Involved:  1. Muscles  2. Ligaments Body Functions Affected:  1. Sensory/Pain  2. Neuromusculoskeletal  3. Movement Related Activities and Participation Affected:  1. General Tasks and Demands  2. Self Care  3. Domestic Life  4.  Interpersonal Interactions and Relationships  5. Community, Social and [place]   Number of elements (examined above) that affect the Plan of Care: 4+: HIGH COMPLEXITY   CLINICAL PRESENTATION:   Presentation: Evolving clinical presentation with changing clinical characteristics: MODERATE COMPLEXITY   CLINICAL DECISION MAKING:   Outcome Measure: Tool Used: Disabilities of the Arm, Shoulder and Hand (DASH) Questionnaire - Quick Version  Score:  Initial: 26/55  Most Recent: 23/55 (Date: 6/1/18 )   Interpretation of Score: The DASH is designed to measure the activities of daily living in person's with upper extremity dysfunction or pain. Each section is scored on a 1-5 scale, 5 representing the greatest disability. The scores of each section are added together for a total score of 55. Score 11 12-19 20-28 29-37 38-45 46-54 55   Modifier CH CI CJ CK CL CM CN           Medical Necessity:   · Patient is expected to demonstrate progress in strength and pain levels to increase her ability to use the left arm for housework and computer work. Reason for Services/Other Comments:  · Patient continues to require skilled intervention due to ongoing pain in the left chest along pectorals that radiates into the shoulder blades. this pain affects her ability to perform housework and her computer work. Use of outcome tool(s) and clinical judgement create a POC that gives a: Questionable prediction of patient's progress: MODERATE COMPLEXITY            TREATMENT:   (In addition to Assessment/Re-Assessment sessions the following treatments were rendered)  Pre-treatment Symptoms/Complaints:  Pt reporting she saw MD at [facility] and x-rays of her neck revealed some arthritic changes that are now prompting a MRI. Pain: Initial:   Pain Intensity 1: 3  Post Session:  3/10     THERAPEUTIC EXERCISE: (45 minutes):  Exercises per grid below to improve mobility and strength. Required minimal verbal cues to promote proper body mechanics.   Progressed resistance, range, repetitions and complexity of movement as indicated. Nu-step level 2 x 6 min-held  Upper arm bike x 6 min  Biceps stretching with arm on doorway 3x hold 20 sec  t-bar overhead stretch x 10 hold 5 sec at top-  t-bar ext stretching x 10 hold 10 sec-  IR 15# x 20-held  Rows 20# x 20  Punches 15# x 20-held  Double arm rows 45# x 25  Shoulder pull downs 45# x 25  Prone shoulder ext 4# x 20-held  Prone shoulder rows 4# x 20 - held  Prone h. abd with light assistance x 20 2#-held  Prone scap retract to work on scapular depression-mild cuing required x 15-held  Sidelying ER 3# x 20-held  3 way arm raises on swiss ball x 10-held  Seated on swiss ball with rotation with green tubing to bilateral sides x 20-  Seated on swiss ball with bilateral rows yellow x 25-held  Seated on swiss ball with bilateral arm punches x 25-held  Seated forward trunk stretching on swiss ball x 4 -held  Bridging x 20  Bridging with alternating leg kicks x 20  LTR x 20  Knee to chest x 5 hold 15 sec-held  Prayer stretch on table 3 hold 20 sec-held  Arm rows underhand  with mini-squat position 45# x 20  Seated on swiss ball marching x 20-held  Seated on swiss ball LAQ x 20-held  Seated on swiss ball with overhead ball raises 6# medball x 15  Three way arm raises to 90 x 10 with scap retract  Hor abd. Adduction with red TB x 20  Manual Therapy (   10 min    ): STM to work on the pectorals as well as the upper neck region for improved pain control    Therapeutic Modalities:MHP x 10 minutes post treatment for pain relief to left shoulder and pectoral region as well as thoracic spine    HEP: continue as directed  Access Code: [email or web address]   URL: [email or web address]   Date: 04/30/2018   Prepared by: Germain Rashid     Exercises   Corner Pec Major Stretch - 5 reps - 1 sets - 2x daily   Standing Bicep Stretch at Wall - 5 reps - 1 sets - 2x daily   Chest and Bicep Stretch - Arms Behind Back - 5 reps - 1 sets - 2x daily Standing Shoulder Posterior Capsule Stretch - 5 reps - 1 sets - 2x daily   Standing Lower Cervical and Upper Thoracic Stretch - 5 reps - 1 sets - 2x daily    Treatment/Session Assessment:    · Response to Treatment:  Pt reporting no increased pain today, but she is still dealing with the same type of pain. MD would like to do MRI on c-spine as their is some DDD. She has reported the therapy is helping overall and she has felt better since beginning therapy. · Compliance with Program/Exercises: compliant  · Recommendations/Intent for next treatment session: \"Next visit will focus on advancements to more challenging activities\".   Total Treatment Duration: 65 min  PT Patient Time In/Time Out  Time In: 1200  Time Out: 0105  Treatment number 1901 [facility] BAM Figueroa PT

## 2018-07-02 ENCOUNTER — HOSPITAL ENCOUNTER (OUTPATIENT)
Dept: PHYSICAL THERAPY | Age: 43
Discharge: HOME | End: 2018-07-02
Payer: COMMERCIAL

## 2018-07-02 PROCEDURE — 97140 MANUAL THERAPY 1/> REGIONS: CPT

## 2018-07-02 PROCEDURE — 97110 THERAPEUTIC EXERCISES: CPT

## 2018-07-02 NOTE — PROGRESS NOTES
Abhishek Chapincito  : 1975  Payor: Albert Rounds / Plan: [address] HMO / Product Type: HMO /  [address] [place]  at [place] [address] Therapy  [address], [address]  [phone]   [phone]         OUTPATIENT PHYSICAL THERAPY:Daily Note and Recertification 3/6/7967    ICD-10: Treatment Diagnosis: pain in thoracic spine M54.6, muscle weakness M62.81,     Precautions/Allergies:   Shellfish derived   Fall Risk Score: 0 (? 5 = High Risk)  MD Orders: Eval and treat MEDICAL/REFERRING DIAGNOSIS:  Pain in thoracic spine [M54.6]  Chest pain on breathing [R07.1]   DATE OF ONSET: 2018  REFERRING PHYSICIAN: Silver Holland, Melina Gaona, *  RETURN PHYSICIAN APPOINTMENT: as needed     INITIAL ASSESSMENT:  Ms. Gregg Vela presents with increased left chest pain just inferior to the clavicle and then her pain radiates down into the arm slightly and into the shoulder blades and thoracic spine. She reported no injury to the region and the pain worsened in January that she did undergo ER evaluation which came back negative for any cardiac history. She has also had a follow-up with cardiologist which revealed a slight murmur, but echo and treadmill stress testing were normal.  She is reporting her prescribed NSAID is helping to relieve some of the pain. She continues to report folding laundry, housework and computer work can increase her symptoms. She reports lying down relieves her symptoms. She would like to try therapy to see if her symptoms improve. PROBLEM LIST (Impacting functional limitations):  1. Decreased Strength  2. Decreased ADL/Functional Activities  3. Increased Pain  4. Decreased Activity Tolerance INTERVENTIONS PLANNED:  1. Heat  2. Home Exercise Program (HEP)  3. Manual Therapy  4. Therapeutic Exercise/Strengthening  5. Ultrasound (US)   TREATMENT PLAN:  Effective Dates: 18 TO 2018 (60 days).  Frequency/Duration: 2 times a week for 60 Days    GOALS: (Goals have been discussed and agreed upon with patient.)  Short-Term Functional Goals: Time Frame: 4 weeks  1. Establish independent HEP with no cueing.-met  2. Pt will be able to report 5/10 pain rating or less with ADL's.-met  3. Pt will be able to work on her computer for 20 minutes with less pain.-met  Discharge Goals: Time Frame: 8 weeks  1. Pt will be able to report 3/10 pain rating or less with ADL's.-intermittently meeting  2. Pt will be able to fold laundry, vacuum up to 20 minutes with no increase in symptoms.-in progress  3. Pt will be able to increase strength in bilateral UE's by at least one full grade for improved lifting abilities.-in progress  Rehabilitation Potential For Stated Goals: Good  Regarding [facility]'s therapy, I certify that the treatment plan above will be carried out by a therapist or under their direction. Thank you for this referral,  Ozzie Reardon, PT                 The information in this section was collected on 4/30/18 (except where otherwise noted). HISTORY:   History of Present Injury/Illness (Reason for Referral):  Ms. Rodriguez Montalvo presents with increased left chest pain just inferior to the clavicle and then her pain radiates down into the arm slightly and into the shoulder blades and thoracic spine. She reported no injury to the region and the pain worsened in January that she did undergo ER evaluation which came back negative for any cardiac history. She has also had a follow-up with cardiologist which revealed a slight murmur, but echo and treadmill stress testing were normal.  She is reporting her prescribed NSAID is helping to relieve some of the pain. She continues to report folding laundry, housework and computer work can increase her symptoms. She reports lying down relieves her symptoms. She would like to try therapy to see if her symptoms improve.   Past Medical History/Comorbidities:   Ms. Rodriguez Montalvo  has a past medical history of Dyspepsia and other specified disorders of function of stomach; Irregular heart beat; and Obesity (BMI 30-39.9). She also has no past medical history of Difficult intubation; Malignant hyperthermia due to anesthesia; Nausea & vomiting; Pseudocholinesterase deficiency; or Unspecified adverse effect of anesthesia. Ms. Shanice Loja  has a past surgical history that includes hx orthopaedic; hx  section; hx leep procedure; hx tonsillectomy (childhood); and hx cholecystectomy. Social History/Living Environment:     pt lives with supportive family. She has a 8year old sone and 15year old son  Prior Level of Function/Work/Activity:  Pt works part-time as a   Dominant Side:         RIGHT  Current Medications:       Current Outpatient Prescriptions:     meloxicam (MOBIC) 15 mg tablet, Take 1 Tab by mouth daily. , Disp: 30 Tab, Rfl: 1    cyclobenzaprine (FLEXERIL) 10 mg tablet, Take 1 Tab by mouth nightly., Disp: 30 Tab, Rfl: 1    Magnesium Oxide 500 mg cap, Take  by mouth., Disp: , Rfl:    Date Last Reviewed:  2018     Number of Personal Factors/Comorbidities that affect the Plan of Care: 1-2: MODERATE COMPLEXITY   EXAMINATION:   Palpation:          Increased pain just inferior to clavicle around pec major  ROM:        Bilateral UE motion WFL  Bilateral UE ROM WFL  Cervical ROM WFL                                    Strength:     R UE flex 4/5, abd 4-/5, ER 4-/5, IR 4-/5, biceps 4/5         L UE flex 3+/5, abd 3+/5, ER 3+/5, IR 3+/5, biceps 4-/5            Special Tests: negative Salli Roosevelt and negative empty can for left shoulder. She tested positive for pain with resisted pectoral testing  Neurological Screen: na  Balance:  na   Body Structures Involved:  1. Muscles  2. Ligaments Body Functions Affected:  1. Sensory/Pain  2. Neuromusculoskeletal  3. Movement Related Activities and Participation Affected:  1. General Tasks and Demands  2. Self Care  3. Domestic Life  4.  Interpersonal Interactions and Relationships  5. Community, Social and [place]   Number of elements (examined above) that affect the Plan of Care: 4+: HIGH COMPLEXITY   CLINICAL PRESENTATION:   Presentation: Evolving clinical presentation with changing clinical characteristics: MODERATE COMPLEXITY   CLINICAL DECISION MAKING:   Outcome Measure: Tool Used: Disabilities of the Arm, Shoulder and Hand (DASH) Questionnaire - Quick Version  Score:  Initial: 26/55  Most Recent: 23/55 (Date: 6/1/18 )   Interpretation of Score: The DASH is designed to measure the activities of daily living in person's with upper extremity dysfunction or pain. Each section is scored on a 1-5 scale, 5 representing the greatest disability. The scores of each section are added together for a total score of 55. Score 11 12-19 20-28 29-37 38-45 46-54 55   Modifier CH CI CJ CK CL CM CN           Medical Necessity:   · Patient is expected to demonstrate progress in strength and pain levels to increase her ability to use the left arm for housework and computer work. Reason for Services/Other Comments:  · Patient continues to require skilled intervention due to ongoing pain in the left chest along pectorals that radiates into the shoulder blades. this pain affects her ability to perform housework and her computer work. Use of outcome tool(s) and clinical judgement create a POC that gives a: Questionable prediction of patient's progress: MODERATE COMPLEXITY            TREATMENT:   (In addition to Assessment/Re-Assessment sessions the following treatments were rendered)  Pre-treatment Symptoms/Complaints:  Pt reporting she felt good with prednisone, but now that she is tapering she has has some increased left sided cervical tightness and mid back tightness. Pain: Initial:   Pain Intensity 1: 4  Post Session:  3/10     THERAPEUTIC EXERCISE: (40 minutes):  Exercises per grid below to improve mobility and strength.   Required minimal verbal cues to promote proper body mechanics. Progressed resistance, range, repetitions and complexity of movement as indicated. Nu-step level 2 x 6 min-held  Upper arm bike x 6 min  Biceps stretching with arm on doorway 3x hold 20 sec  t-bar overhead stretch x 10 hold 5 sec at top-  t-bar ext stretching x 10 hold 10 sec-  IR 15# x 20-held  Rows 20# x 20  Punches 15# x 20-held  Double arm rows 45# x 25  Shoulder pull downs 45# x 25  Prone shoulder ext 4# x 20-held  Prone shoulder rows 4# x 20 - held  Prone h. abd with light assistance x 20 2#-held  Prone scap retract to work on scapular depression-mild cuing required x 15-held  Sidelying ER 3# x 20-held  3 way arm raises on swiss ball x 10-held  Seated on swiss ball with rotation with green tubing to bilateral sides x 20-held  Seated on swiss ball with bilateral rows yellow x 25-held  Seated on swiss ball with bilateral arm punches x 25-held  Seated forward trunk stretching on swiss ball x 4 -held  Bridging x 20  Bridging with alternating leg kicks x 20  LTR x 20-held  Knee to chest x 5 hold 15 sec-held  Prayer stretch on table 3 hold 20 sec-held  Arm rows underhand  with mini-squat position 45# x 20  Seated on swiss ball marching x 20-held  Seated on swiss ball LAQ x 20-held  Seated on swiss ball with overhead ball raises 6# medball x 15  Three way arm raises to 90 x 10 with scap retract  Hor abd. Adduction with red TB x 20  Seated cervical retraction x 10 hold 5 sec  Manual Therapy (   15 min    ): STM to work on the pectorals as well as the upper neck region for improved pain control    Therapeutic Modalities:MHP x 10 minutes post treatment for pain relief to left shoulder and pectoral region as well as thoracic spine    HEP:pt to add cervical retraction  Access Code: [email or web address]   URL: [email or web address]   Date: 04/30/2018   Prepared by: Reese Lopes   Corner Pec Major Stretch - 5 reps - 1 sets - 2x daily   Standing Bicep Stretch at [address] 5 reps - 1 sets - 2x daily   Chest and Bicep Stretch - Arms Behind Back - 5 reps - 1 sets - 2x daily   Standing Shoulder Posterior Capsule Stretch - 5 reps - 1 sets - 2x daily   Standing Lower Cervical and Upper Thoracic Stretch - 5 reps - 1 sets - 2x daily    Treatment/Session Assessment:    · Response to Treatment:  Pt reporting an increase in her left upper trap and cervical pain. She reports it feels more tense today. She did report sleeping on a different mattress over the weekend and thinks this may have caused some pain. She progressed well today, but did experience some tightening around the left upper trap. Pt received manual therapy and some first rib mobs to help decrease her pain. She will add cervical retraction to HEP. · Compliance with Program/Exercises: compliant  · Recommendations/Intent for next treatment session: \"Next visit will focus on advancements to more challenging activities\".   Total Treatment Duration: 65 min  PT Patient Time In/Time Out  Time In: 1035  Time Out: 1140  Treatment number [id number] Cleo Hodge PT

## 2018-07-05 ENCOUNTER — HOSPITAL ENCOUNTER (OUTPATIENT)
Dept: PHYSICAL THERAPY | Age: 43
Discharge: HOME | End: 2018-07-05
Payer: COMMERCIAL

## 2018-07-05 PROCEDURE — 97140 MANUAL THERAPY 1/> REGIONS: CPT

## 2018-07-05 PROCEDURE — 97110 THERAPEUTIC EXERCISES: CPT

## 2018-07-05 NOTE — PROGRESS NOTES
Otto Connor  : 1975  Payor: Katie Mcelroy / Plan: [address] HMO / Product Type: HMO /  [address] at [place] [address] Therapy  [address], [address]  [phone]   [phone]         OUTPATIENT PHYSICAL THERAPY:Daily Note 2018    ICD-10: Treatment Diagnosis: pain in thoracic spine M54.6, muscle weakness M62.81,     Precautions/Allergies:   Shellfish derived   Fall Risk Score: 0 (? 5 = High Risk)  MD Orders: Eval and treat MEDICAL/REFERRING DIAGNOSIS:  Pain in thoracic spine [M54.6]  Chest pain on breathing [R07.1]   DATE OF ONSET: 2018  REFERRING PHYSICIAN: Maggi Turner, *  RETURN PHYSICIAN APPOINTMENT: as needed     INITIAL ASSESSMENT:  Ms. Maryan Amezcua presents with increased left chest pain just inferior to the clavicle and then her pain radiates down into the arm slightly and into the shoulder blades and thoracic spine. She reported no injury to the region and the pain worsened in January that she did undergo ER evaluation which came back negative for any cardiac history. She has also had a follow-up with cardiologist which revealed a slight murmur, but echo and treadmill stress testing were normal.  She is reporting her prescribed NSAID is helping to relieve some of the pain. She continues to report folding laundry, housework and computer work can increase her symptoms. She reports lying down relieves her symptoms. She would like to try therapy to see if her symptoms improve. PROBLEM LIST (Impacting functional limitations):  1. Decreased Strength  2. Decreased ADL/Functional Activities  3. Increased Pain  4. Decreased Activity Tolerance INTERVENTIONS PLANNED:  1. Heat  2. Home Exercise Program (HEP)  3. Manual Therapy  4. Therapeutic Exercise/Strengthening  5. Ultrasound (US)   TREATMENT PLAN:  Effective Dates: 18 TO 2018 (60 days).  Frequency/Duration: 2 times a week for 60 Days    GOALS: (Goals have been discussed and agreed upon with patient.)  Short-Term Functional Goals: Time Frame: 4 weeks  1. Establish independent HEP with no cueing.-met  2. Pt will be able to report 5/10 pain rating or less with ADL's.-met  3. Pt will be able to work on her computer for 20 minutes with less pain.-met  Discharge Goals: Time Frame: 8 weeks  1. Pt will be able to report 3/10 pain rating or less with ADL's.-intermittently meeting  2. Pt will be able to fold laundry, vacuum up to 20 minutes with no increase in symptoms.-in progress  3. Pt will be able to increase strength in bilateral UE's by at least one full grade for improved lifting abilities.-in progress  Rehabilitation Potential For Stated Goals: Good  Regarding [facility] therapy, I certify that the treatment plan above will be carried out by a therapist or under their direction. Thank you for this referral,  Ozzie Reardon, PT                 The information in this section was collected on 4/30/18 (except where otherwise noted). HISTORY:   History of Present Injury/Illness (Reason for Referral):  Ms. Rodriguez Montalvo presents with increased left chest pain just inferior to the clavicle and then her pain radiates down into the arm slightly and into the shoulder blades and thoracic spine. She reported no injury to the region and the pain worsened in January that she did undergo ER evaluation which came back negative for any cardiac history. She has also had a follow-up with cardiologist which revealed a slight murmur, but echo and treadmill stress testing were normal.  She is reporting her prescribed NSAID is helping to relieve some of the pain. She continues to report folding laundry, housework and computer work can increase her symptoms. She reports lying down relieves her symptoms. She would like to try therapy to see if her symptoms improve.   Past Medical History/Comorbidities:   Ms. Rodriguez Montalvo  has a past medical history of Dyspepsia and other specified disorders of function of stomach; Irregular heart beat; and Obesity (BMI 30-39.9). She also has no past medical history of Difficult intubation; Malignant hyperthermia due to anesthesia; Nausea & vomiting; Pseudocholinesterase deficiency; or Unspecified adverse effect of anesthesia. Ms. Sonny Miranda  has a past surgical history that includes hx orthopaedic; hx  section; hx leep procedure; hx tonsillectomy (childhood); and hx cholecystectomy. Social History/Living Environment:     pt lives with supportive family. She has a 8year old sone and 15year old son  Prior Level of Function/Work/Activity:  Pt works part-time as a   Dominant Side:         RIGHT  Current Medications:       Current Outpatient Prescriptions:     meloxicam (MOBIC) 15 mg tablet, Take 1 Tab by mouth daily. , Disp: 30 Tab, Rfl: 1    cyclobenzaprine (FLEXERIL) 10 mg tablet, Take 1 Tab by mouth nightly., Disp: 30 Tab, Rfl: 1    Magnesium Oxide 500 mg cap, Take  by mouth., Disp: , Rfl:    Date Last Reviewed:  2018     Number of Personal Factors/Comorbidities that affect the Plan of Care: 1-2: MODERATE COMPLEXITY   EXAMINATION:   Palpation:          Increased pain just inferior to clavicle around pec major  ROM:        Bilateral UE motion WFL  Bilateral UE ROM WFL  Cervical ROM WFL                                    Strength:     R UE flex 4/5, abd 4-/5, ER 4-/5, IR 4-/5, biceps 4/5         L UE flex 3+/5, abd 3+/5, ER 3+/5, IR 3+/5, biceps 4-/5            Special Tests: negative [employer] and negative empty can for left shoulder. She tested positive for pain with resisted pectoral testing  Neurological Screen: na  Balance:  na   Body Structures Involved:  1. Muscles  2. Ligaments Body Functions Affected:  1. Sensory/Pain  2. Neuromusculoskeletal  3. Movement Related Activities and Participation Affected:  1. General Tasks and Demands  2. Self Care  3. Domestic Life  4.  Interpersonal Interactions and Relationships  5. Community, Social and [place]   Number of elements (examined above) that affect the Plan of Care: 4+: HIGH COMPLEXITY   CLINICAL PRESENTATION:   Presentation: Evolving clinical presentation with changing clinical characteristics: MODERATE COMPLEXITY   CLINICAL DECISION MAKING:   Outcome Measure: Tool Used: Disabilities of the Arm, Shoulder and Hand (DASH) Questionnaire - Quick Version  Score:  Initial: 26/55  Most Recent: 23/55 (Date: 6/1/18 )   Interpretation of Score: The DASH is designed to measure the activities of daily living in person's with upper extremity dysfunction or pain. Each section is scored on a 1-5 scale, 5 representing the greatest disability. The scores of each section are added together for a total score of 55. Score 11 12-19 20-28 29-37 38-45 46-54 55   Modifier CH CI CJ CK CL CM CN           Medical Necessity:   · Patient is expected to demonstrate progress in strength and pain levels to increase her ability to use the left arm for housework and computer work. Reason for Services/Other Comments:  · Patient continues to require skilled intervention due to ongoing pain in the left chest along pectorals that radiates into the shoulder blades. this pain affects her ability to perform housework and her computer work. Use of outcome tool(s) and clinical judgement create a POC that gives a: Questionable prediction of patient's progress: MODERATE COMPLEXITY            TREATMENT:   (In addition to Assessment/Re-Assessment sessions the following treatments were rendered)  Pre-treatment Symptoms/Complaints:  Pt reporting she has felt better over the past several days. She reports the cervical retraction exercise is helping. Pain: Initial:   Pain Intensity 1: 3  Post Session:  3/10     THERAPEUTIC EXERCISE: (35 minutes):  Exercises per grid below to improve mobility and strength. Required minimal verbal cues to promote proper body mechanics.   Progressed resistance, range, repetitions and complexity of movement as indicated. Nu-step level 2 x 6 min-held  Upper arm bike x 6 min  Biceps stretching with arm on doorway 3x hold 20 sec  t-bar overhead stretch x 10 hold 5 sec at top-  t-bar ext stretching x 10 hold 10 sec-  IR 15# x 20  Rows 20# x 20  Punches 15# x 20-held  Double arm rows 45# x 25  Shoulder pull downs 45# x 25  Prone shoulder ext 4# x 20-held  Prone shoulder rows 4# x 20 -  Prone h. abd with light assistance x 20 2#-held  Prone scap retract to work on scapular depression-mild cuing required x 15-held  Sidelying ER 3# x 20-held  3 way arm raises on swiss ball x 10-held  Seated on swiss ball with rotation with green tubing to bilateral sides x 20-held  Seated on swiss ball with bilateral rows yellow x 25-held  Seated on swiss ball with bilateral arm punches x 25-held  Seated forward trunk stretching on swiss ball x 4 -held  Bridging x 20  Bridging with alternating leg kicks x 20  LTR x 20-held  Knee to chest x 5 hold 15 sec-held  Prayer stretch on table 3 hold 20 sec-held  Arm rows underhand  with mini-squat position 45# x 20  Seated on swiss ball marching x 20-held  Seated on swiss ball LAQ x 20-held  Seated on swiss ball with overhead ball raises 6# medball x 15-held  Three way arm raises to 90 x 10 with scap retract  Hor abd. Adduction with red TB x 20  Seated cervical retraction x 10 hold 5 sec-held  ER 10# x 20  Manual Therapy (   15 min    ): STM to work on the pectorals as well as the upper neck region for improved pain control    Therapeutic Modalities:MHP x 10 minutes post treatment for pain relief to left shoulder and pectoral region as well as thoracic spine    HEP:pt to add cervical retraction  Access Code: [email or web address]   URL: [email or web address]   Date: 04/30/2018   Prepared by: Britta Corcoran     Exercises   Corner Pec Major Stretch - 5 reps - 1 sets - 2x daily   Standing Bicep Stretch at Wall - 5 reps - 1 sets - 2x daily   Chest and Bicep Stretch - Arms Behind Back - 5 reps - 1 sets - 2x daily   Standing Shoulder Posterior Capsule Stretch - 5 reps - 1 sets - 2x daily   Standing Lower Cervical and Upper Thoracic Stretch - 5 reps - 1 sets - 2x daily    Treatment/Session Assessment:    · Response to Treatment:  Pt reporting a decrease in her pain levels. Sh reports the retraction exercise is helping. She was also able to work on her computer for a longer period of time. She progressed well with exercises and did have some tightness noted on the left side of the neck near the first rib and articular column. She continues to benefit from exercises as well as manual therapy and heat. · Compliance with Program/Exercises: compliant  · Recommendations/Intent for next treatment session: \"Next visit will focus on advancements to more challenging activities\".   Total Treatment Duration: 60 min  PT Patient Time In/Time Out  Time In: 1030  Time Out: 1130  Treatment number [address] [facility],

## 2018-07-10 ENCOUNTER — HOSPITAL ENCOUNTER (OUTPATIENT)
Dept: PHYSICAL THERAPY | Age: 43
Discharge: HOME | End: 2018-07-10
Payer: COMMERCIAL

## 2018-07-10 PROCEDURE — 97140 MANUAL THERAPY 1/> REGIONS: CPT

## 2018-07-10 PROCEDURE — 97110 THERAPEUTIC EXERCISES: CPT

## 2018-07-11 NOTE — PROGRESS NOTES
Stoney Hernandez  : 1975  Payor: Kelle Chan / Plan: [address] HMO / Product Type: HMO /  [address] [place]  at [place] [address] Therapy  [address], [address]  [phone]   [phone]         OUTPATIENT PHYSICAL [address]man Note 7/10/2018    ICD-10: Treatment Diagnosis: pain in thoracic spine M54.6, muscle weakness M62.81,     Precautions/Allergies:   Shellfish derived   Fall Risk Score: 0 (? 5 = High Risk)  MD Orders: Eval and treat MEDICAL/REFERRING DIAGNOSIS:  Pain in thoracic spine [M54.6]  Chest pain on breathing [R07.1]   DATE OF ONSET: 2018  REFERRING PHYSICIAN: Saray White, Christina Tena, *  RETURN PHYSICIAN APPOINTMENT: as needed     INITIAL ASSESSMENT:  Ms. Melo Kaiser presents with increased left chest pain just inferior to the clavicle and then her pain radiates down into the arm slightly and into the shoulder blades and thoracic spine. She reported no injury to the region and the pain worsened in January that she did undergo ER evaluation which came back negative for any cardiac history. She has also had a follow-up with cardiologist which revealed a slight murmur, but echo and treadmill stress testing were normal.  She is reporting her prescribed NSAID is helping to relieve some of the pain. She continues to report folding laundry, housework and computer work can increase her symptoms. She reports lying down relieves her symptoms. She would like to try therapy to see if her symptoms improve. PROBLEM LIST (Impacting functional limitations):  1. Decreased Strength  2. Decreased ADL/Functional Activities  3. Increased Pain  4. Decreased Activity Tolerance INTERVENTIONS PLANNED:  1. Heat  2. Home Exercise Program (HEP)  3. Manual Therapy  4. Therapeutic Exercise/Strengthening  5. Ultrasound (US)   TREATMENT PLAN:  Effective Dates: 18 TO 2018 (60 days).  Frequency/Duration: 2 times a week for 60 Days    GOALS: (Goals have been discussed and agreed upon with patient.)  Short-Term Functional Goals: Time Frame: 4 weeks  1. Establish independent HEP with no cueing.-met  2. Pt will be able to report 5/10 pain rating or less with ADL's.-met  3. Pt will be able to work on her computer for 20 minutes with less pain.-met  Discharge Goals: Time Frame: 8 weeks  1. Pt will be able to report 3/10 pain rating or less with ADL's.-intermittently meeting  2. Pt will be able to fold laundry, vacuum up to 20 minutes with no increase in symptoms.-in progress  3. Pt will be able to increase strength in bilateral UE's by at least one full grade for improved lifting abilities.-in progress  Rehabilitation Potential For Stated Goals: Good  Regarding Franky Valentine's therapy, I certify that the treatment plan above will be carried out by a therapist or under their direction. Thank you for this referral,  Pal James, PT                 The information in this section was collected on 4/30/18 (except where otherwise noted). HISTORY:   History of Present Injury/Illness (Reason for Referral):  Ms. Adore Jordan presents with increased left chest pain just inferior to the clavicle and then her pain radiates down into the arm slightly and into the shoulder blades and thoracic spine. She reported no injury to the region and the pain worsened in January that she did undergo ER evaluation which came back negative for any cardiac history. She has also had a follow-up with cardiologist which revealed a slight murmur, but echo and treadmill stress testing were normal.  She is reporting her prescribed NSAID is helping to relieve some of the pain. She continues to report folding laundry, housework and computer work can increase her symptoms. She reports lying down relieves her symptoms. She would like to try therapy to see if her symptoms improve.   Past Medical History/Comorbidities:   Ms. Adore Jordan  has a past medical history of Dyspepsia and other specified disorders of function of stomach; Irregular heart beat; and Obesity (BMI 30-39.9). She also has no past medical history of Difficult intubation; Malignant hyperthermia due to anesthesia; Nausea & vomiting; Pseudocholinesterase deficiency; or Unspecified adverse effect of anesthesia. Ms. Eliceo Torres  has a past surgical history that includes hx orthopaedic; hx  section; hx leep procedure; hx tonsillectomy (childhood); and hx cholecystectomy. Social History/Living Environment:     pt lives with supportive family. She has a 8year old sone and 15year old son  Prior Level of Function/Work/Activity:  Pt works part-time as a   Dominant Side:         RIGHT  Current Medications:       Current Outpatient Prescriptions:     meloxicam (MOBIC) 15 mg tablet, Take 1 Tab by mouth daily. , Disp: 30 Tab, Rfl: 1    cyclobenzaprine (FLEXERIL) 10 mg tablet, Take 1 Tab by mouth nightly., Disp: 30 Tab, Rfl: 1    Magnesium Oxide 500 mg cap, Take  by mouth., Disp: , Rfl:    Date Last Reviewed:  7/10/2018     Number of Personal Factors/Comorbidities that affect the Plan of Care: 1-2: MODERATE COMPLEXITY   EXAMINATION:   Palpation:          Increased pain just inferior to clavicle around pec major  ROM:        Bilateral UE motion WFL  Bilateral UE ROM WFL  Cervical ROM WFL                                    Strength:     R UE flex 4/5, abd 4-/5, ER 4-/5, IR 4-/5, biceps 4/5         L UE flex 3+/5, abd 3+/5, ER 3+/5, IR 3+/5, biceps 4-/5            Special Tests: negative Bettyann Froilan and negative empty can for left shoulder. She tested positive for pain with resisted pectoral testing  Neurological Screen: na  Balance:  na   Body Structures Involved:  1. Muscles  2. Ligaments Body Functions Affected:  1. Sensory/Pain  2. Neuromusculoskeletal  3. Movement Related Activities and Participation Affected:  1. General Tasks and Demands  2. Self Care  3. Domestic Life  4.  Interpersonal Interactions and Relationships  5. Community, Social and [place]   Number of elements (examined above) that affect the Plan of Care: 4+: HIGH COMPLEXITY   CLINICAL PRESENTATION:   Presentation: Evolving clinical presentation with changing clinical characteristics: MODERATE COMPLEXITY   CLINICAL DECISION MAKING:   Outcome Measure: Tool Used: Disabilities of the Arm, Shoulder and Hand (DASH) Questionnaire - Quick Version  Score:  Initial: 26/55  Most Recent: 23/55 (Date: 6/1/18 )   Interpretation of Score: The DASH is designed to measure the activities of daily living in person's with upper extremity dysfunction or pain. Each section is scored on a 1-5 scale, 5 representing the greatest disability. The scores of each section are added together for a total score of 55. Score 11 12-19 20-28 29-37 38-45 46-54 55   Modifier CH CI CJ CK CL CM CN           Medical Necessity:   · Patient is expected to demonstrate progress in strength and pain levels to increase her ability to use the left arm for housework and computer work. Reason for Services/Other Comments:  · Patient continues to require skilled intervention due to ongoing pain in the left chest along pectorals that radiates into the shoulder blades. this pain affects her ability to perform housework and her computer work. Use of outcome tool(s) and clinical judgement create a POC that gives a: Questionable prediction of patient's progress: MODERATE COMPLEXITY            TREATMENT:   (In addition to Assessment/Re-Assessment sessions the following treatments were rendered)  Pre-treatment Symptoms/Complaints:  Pt reporting she is sore and now is off the prednisone and she can tell. She received MRI results which showed DDD and narrowing at C5/C6. Pain: Initial:   Pain Intensity 1: 3  Post Session:  3/10     THERAPEUTIC EXERCISE: (25 minutes):  Exercises per grid below to improve mobility and strength. Required minimal verbal cues to promote proper body mechanics. Progressed resistance, range, repetitions and complexity of movement as indicated. Nu-step level 2 x 6 min-held  Upper arm bike x 6 min  Biceps stretching with arm on doorway 3x hold 20 sec  t-bar overhead stretch x 10 hold 5 sec at top-  t-bar ext stretching x 10 hold 10 sec-  IR 15# x 20-held  Rows 20# x 20-held  Punches 15# x 20-held  Double arm rows 45# x 25  Shoulder pull downs 45# x 25  Prone shoulder ext 4# x 20-held  Prone shoulder rows 4# x 20 -  Prone h. abd with light assistance x 20 2#-held  Prone scap retract to work on scapular depression-mild cuing required x 15-held  Sidelying ER 3# x 20-held  3 way arm raises on swiss ball x 10-held  Seated on swiss ball with rotation with green tubing to bilateral sides x 20-held  Seated on swiss ball with bilateral rows yellow x 25-held  Seated on swiss ball with bilateral arm punches x 25-held  Seated forward trunk stretching on swiss ball x 4 -held  Bridging x 20  Bridging with alternating leg kicks x 20  LTR x 20-held  Knee to chest x 5 hold 15 sec-held  Prayer stretch on table 3 hold 20 sec-held  Arm rows underhand  with mini-squat position 45# x 20-held  Seated on swiss ball marching x 20-held  Seated on swiss ball LAQ x 20-held  Seated on swiss ball with overhead ball raises 6# medball x 15-held  Three way arm raises to 90 x 10 with scap retract  Hor abd. Adduction with red TB x 20-held  Seated cervical retraction x 10 hold 5 sec  ER 10# x 20  Manual Therapy (   20  min    ): STM to work on the pectorals as well as the upper neck region for improved pain control    Therapeutic Modalities:MHP x 10 minutes post treatment for pain relief to left shoulder and pectoral region as well as thoracic spine    HEP:pt to add cervical retraction  Access Code: [email or web address]   URL: [email or web address]   Date: 04/30/2018   Prepared by: Quan Lopes   Corner Pec Major Stretch - 5 reps - 1 sets - 2x daily   Standing Bicep Stretch at [address] 5 reps - 1 sets - 2x daily   Chest and Bicep Stretch - Arms Behind Back - 5 reps - 1 sets - 2x daily   Standing Shoulder Posterior Capsule Stretch - 5 reps - 1 sets - 2x daily   Standing Lower Cervical and Upper Thoracic Stretch - 5 reps - 1 sets - 2x daily    Treatment/Session Assessment:    · Response to Treatment:  Pt reporting increased pain and tightness sine stopping the prednisone. She has osteophytes, DDD and narrowing at C5/C6 based upon new imaging. MD has recommended traction. She received less strengthening today so we could focus more on releasing the tissues around the neck. Pt reported feeling better post treatment. · Compliance with Program/Exercises: compliant  · Recommendations/Intent for next treatment session: \"Next visit will focus on advancements to more challenging activities\".   Total Treatment Duration: 55 min  PT Patient Time In/Time Out  Time In: 0115  Time Out: 0210  Treatment number [facility], PT

## 2018-07-11 NOTE — PROGRESS NOTES
Therapy Center at [facility] Therapy   [address], [address]  [phone]   [phone]    OUTPATIENT DAILY NOTE    NAME/AGE/GENDER: Randolph Trejo is a 44 y.o. female. DATE: 7/11/2018    Called referring MD to add diagnosis code of cervicalgia. Awaiting for clearance.       Allison Shaw, PT

## 2018-07-12 ENCOUNTER — HOSPITAL ENCOUNTER (OUTPATIENT)
Dept: PHYSICAL THERAPY | Age: 43
Discharge: HOME | End: 2018-07-12
Payer: COMMERCIAL

## 2018-07-12 PROCEDURE — 97140 MANUAL THERAPY 1/> REGIONS: CPT

## 2018-07-12 PROCEDURE — 97110 THERAPEUTIC EXERCISES: CPT

## 2018-07-12 PROCEDURE — 97012 MECHANICAL TRACTION THERAPY: CPT

## 2018-07-12 NOTE — PROGRESS NOTES
Tushar Pedersen  : 1975  Payor: Baylee Monroe / Plan: [address] HMO / Product Type: HMO /  [address] [place]  at [place] [address] Therapy  [address], [address]  [phone]   [phone]         OUTPATIENT PHYSICAL THERAPY:Daily Note 2018    ICD-10: Treatment Diagnosis: pain in thoracic spine M54.6, muscle weakness M62.81,     Precautions/Allergies:   Shellfish derived   Fall Risk Score: 0 (? 5 = High Risk)  MD Orders: Eval and treat MEDICAL/REFERRING DIAGNOSIS:  Pain in thoracic spine [M54.6]  Chest pain on breathing [R07.1]  Spinal stenosis, cervical region [M48.02]  Radiculopathy, cervical region [M54.12]  Cervicalgia [M54.2]   DATE OF ONSET: 2018  REFERRING PHYSICIAN: Socorro Tarango, *  RETURN PHYSICIAN APPOINTMENT: as needed     INITIAL ASSESSMENT:  Ms. Jonathan Nova presents with increased left chest pain just inferior to the clavicle and then her pain radiates down into the arm slightly and into the shoulder blades and thoracic spine. She reported no injury to the region and the pain worsened in January that she did undergo ER evaluation which came back negative for any cardiac history. She has also had a follow-up with cardiologist which revealed a slight murmur, but echo and treadmill stress testing were normal.  She is reporting her prescribed NSAID is helping to relieve some of the pain. She continues to report folding laundry, housework and computer work can increase her symptoms. She reports lying down relieves her symptoms. She would like to try therapy to see if her symptoms improve. PROBLEM LIST (Impacting functional limitations):  1. Decreased Strength  2. Decreased ADL/Functional Activities  3. Increased Pain  4. Decreased Activity Tolerance INTERVENTIONS PLANNED:  1. Heat  2. Home Exercise Program (HEP)  3. Manual Therapy  4. Therapeutic Exercise/Strengthening  5.  Ultrasound (US)   TREATMENT PLAN:  Effective Dates: 7/2/18 TO 8/30/2018 (60 days). Frequency/Duration: 2 times a week for 60 Days    GOALS: (Goals have been discussed and agreed upon with patient.)  Short-Term Functional Goals: Time Frame: 4 weeks  1. Establish independent HEP with no cueing.-met  2. Pt will be able to report 5/10 pain rating or less with ADL's.-met  3. Pt will be able to work on her computer for 20 minutes with less pain.-met  Discharge Goals: Time Frame: 8 weeks  1. Pt will be able to report 3/10 pain rating or less with ADL's.-intermittently meeting  2. Pt will be able to fold laundry, vacuum up to 20 minutes with no increase in symptoms.-in progress  3. Pt will be able to increase strength in bilateral UE's by at least one full grade for improved lifting abilities.-in progress  Rehabilitation Potential For Stated Goals: Good  Regarding Remy Valentine's therapy, I certify that the treatment plan above will be carried out by a therapist or under their direction. Thank you for this referral,  Crystal Evangelista PT                 The information in this section was collected on 4/30/18 (except where otherwise noted). HISTORY:   History of Present Injury/Illness (Reason for Referral):  Ms. Vicky Cruz presents with increased left chest pain just inferior to the clavicle and then her pain radiates down into the arm slightly and into the shoulder blades and thoracic spine. She reported no injury to the region and the pain worsened in January that she did undergo ER evaluation which came back negative for any cardiac history. She has also had a follow-up with cardiologist which revealed a slight murmur, but echo and treadmill stress testing were normal.  She is reporting her prescribed NSAID is helping to relieve some of the pain. She continues to report folding laundry, housework and computer work can increase her symptoms. She reports lying down relieves her symptoms.   She would like to try therapy to see if her symptoms improve. Past Medical History/Comorbidities:   Ms. Rodriguez Montalvo  has a past medical history of Dyspepsia and other specified disorders of function of stomach; Irregular heart beat; and Obesity (BMI 30-39.9). She also has no past medical history of Difficult intubation; Malignant hyperthermia due to anesthesia; Nausea & vomiting; Pseudocholinesterase deficiency; or Unspecified adverse effect of anesthesia. Ms. Rodriguez Montalvo  has a past surgical history that includes hx orthopaedic; hx  section; hx leep procedure; hx tonsillectomy (childhood); and hx cholecystectomy. Social History/Living Environment:     pt lives with supportive family. She has a 8year old sone and 15year old son  Prior Level of Function/Work/Activity:  Pt works part-time as a   Dominant Side:         RIGHT  Current Medications:       Current Outpatient Prescriptions:     meloxicam (MOBIC) 15 mg tablet, Take 1 Tab by mouth daily. , Disp: 30 Tab, Rfl: 1    cyclobenzaprine (FLEXERIL) 10 mg tablet, Take 1 Tab by mouth nightly., Disp: 30 Tab, Rfl: 1    Magnesium Oxide 500 mg cap, Take  by mouth., Disp: , Rfl:    Date Last Reviewed:  2018     Number of Personal Factors/Comorbidities that affect the Plan of Care: 1-2: MODERATE COMPLEXITY   EXAMINATION:   Palpation:          Increased pain just inferior to clavicle around pec major  ROM:        Bilateral UE motion WFL  Bilateral UE ROM WFL  Cervical ROM WFL                                    Strength:     R UE flex 4/5, abd 4-/5, ER 4-/5, IR 4-/5, biceps 4/5         L UE flex 3+/5, abd 3+/5, ER 3+/5, IR 3+/5, biceps 4-/5            Special Tests: negative Kristen Buffy and negative empty can for left shoulder. She tested positive for pain with resisted pectoral testing  Neurological Screen: na  Balance:  na   Body Structures Involved:  1. Muscles  2. Ligaments Body Functions Affected:  1. Sensory/Pain  2. Neuromusculoskeletal  3.  Movement Related Activities and Participation Affected:  1. General Tasks and Demands  2. Self Care  3. Domestic Life  4. Interpersonal Interactions and Relationships  5. Community, Social and [place]   Number of elements (examined above) that affect the Plan of Care: 4+: HIGH COMPLEXITY   CLINICAL PRESENTATION:   Presentation: Evolving clinical presentation with changing clinical characteristics: MODERATE COMPLEXITY   CLINICAL DECISION MAKING:   Outcome Measure: Tool Used: Disabilities of the Arm, Shoulder and Hand (DASH) Questionnaire - Quick Version  Score:  Initial: 26/55  Most Recent: 23/55 (Date: 6/1/18 )   Interpretation of Score: The DASH is designed to measure the activities of daily living in person's with upper extremity dysfunction or pain. Each section is scored on a 1-5 scale, 5 representing the greatest disability. The scores of each section are added together for a total score of 55. Score 11 12-19 20-28 29-37 38-45 46-54 55   Modifier CH CI CJ CK CL CM CN           Medical Necessity:   · Patient is expected to demonstrate progress in strength and pain levels to increase her ability to use the left arm for housework and computer work. Reason for Services/Other Comments:  · Patient continues to require skilled intervention due to ongoing pain in the left chest along pectorals that radiates into the shoulder blades. this pain affects her ability to perform housework and her computer work. Use of outcome tool(s) and clinical judgement create a POC that gives a: Questionable prediction of patient's progress: MODERATE COMPLEXITY            TREATMENT:   (In addition to Assessment/Re-Assessment sessions the following treatments were rendered)  Pre-treatment Symptoms/Complaints:  Pt reporting she felt better and looser after last treatment. Pain: Initial:   Pain Intensity 1: 3  Post Session:  3/10     THERAPEUTIC EXERCISE: (25 minutes):  Exercises per grid below to improve mobility and strength.   Required minimal verbal cues to promote proper body mechanics. Progressed resistance, range, repetitions and complexity of movement as indicated. Nu-step level 2 x 6 min-held  Upper arm bike x 6 min  Biceps stretching with arm on doorway 3x hold 20 sec  t-bar overhead stretch x 10 hold 5 sec at top-held  t-bar ext stretching x 10 hold 10 sec-held  IR 15# x 20-held  Rows 20# x 20-held  Punches 15# x 20-held  Double arm rows 45# x 25  Shoulder pull downs 45# x 25  Prone shoulder ext 4# x 20-held  Prone shoulder rows 4# x 20 -  Prone h. abd with light assistance x 20 2#-held  Prone scap retract to work on scapular depression-mild cuing required x 15-held  Sidelying ER 3# x 20-held  Bridging x 20-held  Bridging with alternating leg kicks x 20-held  Arm rows underhand  with mini-squat position 45# x 25  Three way arm raises to 90 x 10 with scap retract  Hor abd. Adduction with red TB x 20-held  Seated cervical retraction x 10 hold 5 sec  ER 10# x 20-held  Manual Therapy (   20  min    ): STM to work on the pectorals as well as the upper neck region for improved pain control    Therapeutic Modalities:mechanical traction to cervical region x 15 minutes at 15# for pain relief    HEP:pt to add cervical retraction  Access Code: [email or web address]   URL: [email or web address]   Date: 04/30/2018   Prepared by: Lethea Fulling     Exercises   Corner Pec Major Stretch - 5 reps - 1 sets - 2x daily   Standing Bicep Stretch at Wall - 5 reps - 1 sets - 2x daily   Chest and Bicep Stretch - Arms Behind Back - 5 reps - 1 sets - 2x daily   Standing Shoulder Posterior Capsule Stretch - 5 reps - 1 sets - 2x daily   Standing Lower Cervical and Upper Thoracic Stretch - 5 reps - 1 sets - 2x daily    Treatment/Session Assessment:    · Response to Treatment:  Pt reporting no increased pain. She reported feeling better post last treatment.   We are shifting focus to the cervical region and MD has added cervicalgia to diagnosis list.  She does have some narrowing at C5/C6 and has tightness on the left side of her neck. She received manual therapy, exercises and traction. Will continue. · Compliance with Program/Exercises: compliant  · Recommendations/Intent for next treatment session: \"Next visit will focus on advancements to more challenging activities\".   Total Treatment Duration: 60 min  PT Patient Time In/Time Out  Time In: 1245  Time Out: 0145  Treatment number [address], PT

## 2018-07-16 ENCOUNTER — HOSPITAL ENCOUNTER (OUTPATIENT)
Dept: PHYSICAL THERAPY | Age: 43
Discharge: HOME | End: 2018-07-16
Payer: COMMERCIAL

## 2018-07-16 PROCEDURE — 97012 MECHANICAL TRACTION THERAPY: CPT

## 2018-07-16 PROCEDURE — 97110 THERAPEUTIC EXERCISES: CPT

## 2018-07-16 PROCEDURE — 97140 MANUAL THERAPY 1/> REGIONS: CPT

## 2018-07-16 NOTE — PROGRESS NOTES
Stefany Palafox  : 1975  Payor: Daniella Betancourt / Plan: [address] HMO / Product Type: HMO /  [address] at [place] [address] Therapy  [address], [address]  [phone]   [phone]         OUTPATIENT PHYSICAL [address] Note 2018    ICD-10: Treatment Diagnosis: pain in thoracic spine M54.6, muscle weakness M62.81,     Precautions/Allergies:   Shellfish derived   Fall Risk Score: 0 (? 5 = High Risk)  MD Orders: Eval and treat MEDICAL/REFERRING DIAGNOSIS:  Pain in thoracic spine [M54.6]  Chest pain on breathing [R07.1]  Radiculopathy, cervical region [M54.12]  Cervicalgia [M54.2]   DATE OF ONSET: 2018  REFERRING PHYSICIAN: Benji Bryant, *  RETURN PHYSICIAN APPOINTMENT: as needed     INITIAL ASSESSMENT:  Ms. Devendra Cifuentes presents with increased left chest pain just inferior to the clavicle and then her pain radiates down into the arm slightly and into the shoulder blades and thoracic spine. She reported no injury to the region and the pain worsened in January that she did undergo ER evaluation which came back negative for any cardiac history. She has also had a follow-up with cardiologist which revealed a slight murmur, but echo and treadmill stress testing were normal.  She is reporting her prescribed NSAID is helping to relieve some of the pain. She continues to report folding laundry, housework and computer work can increase her symptoms. She reports lying down relieves her symptoms. She would like to try therapy to see if her symptoms improve. PROBLEM LIST (Impacting functional limitations):  1. Decreased Strength  2. Decreased ADL/Functional Activities  3. Increased Pain  4. Decreased Activity Tolerance INTERVENTIONS PLANNED:  1. Heat  2. Home Exercise Program (HEP)  3. Manual Therapy  4. Therapeutic Exercise/Strengthening  5. Ultrasound (US)   TREATMENT PLAN:  Effective Dates: 18 TO 2018 (60 days). Frequency/Duration: 2 times a week for 60 Days    GOALS: (Goals have been discussed and agreed upon with patient.)  Short-Term Functional Goals: Time Frame: 4 weeks  1. Establish independent HEP with no cueing.-met  2. Pt will be able to report 5/10 pain rating or less with ADL's.-met  3. Pt will be able to work on her computer for 20 minutes with less pain.-met  Discharge Goals: Time Frame: 8 weeks  1. Pt will be able to report 3/10 pain rating or less with ADL's.-intermittently meeting  2. Pt will be able to fold laundry, vacuum up to 20 minutes with no increase in symptoms.-in progress  3. Pt will be able to increase strength in bilateral UE's by at least one full grade for improved lifting abilities.-in progress  Rehabilitation Potential For Stated Goals: Good  Regarding Avila Valentine's therapy, I certify that the treatment plan above will be carried out by a therapist or under their direction. Thank you for this referral,  Marcelle Shows                 The information in this section was collected on 4/30/18 (except where otherwise noted). HISTORY:   History of Present Injury/Illness (Reason for Referral):  Ms. Sebastien Laguna presents with increased left chest pain just inferior to the clavicle and then her pain radiates down into the arm slightly and into the shoulder blades and thoracic spine. She reported no injury to the region and the pain worsened in January that she did undergo ER evaluation which came back negative for any cardiac history. She has also had a follow-up with cardiologist which revealed a slight murmur, but echo and treadmill stress testing were normal.  She is reporting her prescribed NSAID is helping to relieve some of the pain. She continues to report folding laundry, housework and computer work can increase her symptoms. She reports lying down relieves her symptoms. She would like to try therapy to see if her symptoms improve.   Past Medical History/Comorbidities:   Ms. Sebastien Laguna has a past medical history of Dyspepsia and other specified disorders of function of stomach; Irregular heart beat; and Obesity (BMI 30-39.9). She also has no past medical history of Difficult intubation; Malignant hyperthermia due to anesthesia; Nausea & vomiting; Pseudocholinesterase deficiency; or Unspecified adverse effect of anesthesia. Ms. Kong Hooker  has a past surgical history that includes hx orthopaedic; hx  section; hx leep procedure; hx tonsillectomy (childhood); and hx cholecystectomy. Social History/Living Environment:     pt lives with supportive family. She has a 8year old sone and 15year old son  Prior Level of Function/Work/Activity:  Pt works part-time as a   Dominant Side:         RIGHT  Current Medications:       Current Outpatient Prescriptions:     meloxicam (MOBIC) 15 mg tablet, Take 1 Tab by mouth daily. , Disp: 30 Tab, Rfl: 1    cyclobenzaprine (FLEXERIL) 10 mg tablet, Take 1 Tab by mouth nightly., Disp: 30 Tab, Rfl: 1    Magnesium Oxide 500 mg cap, Take  by mouth., Disp: , Rfl:    Date Last Reviewed:  2018     Number of Personal Factors/Comorbidities that affect the Plan of Care: 1-2: MODERATE COMPLEXITY   EXAMINATION:   Palpation:          Increased pain just inferior to clavicle around pec major  ROM:        Bilateral UE motion WFL  Bilateral UE ROM WFL  Cervical ROM WFL                                    Strength:     R UE flex 4/5, abd 4-/5, ER 4-/5, IR 4-/5, biceps 4/5         L UE flex 3+/5, abd 3+/5, ER 3+/5, IR 3+/5, biceps 4-/5            Special Tests: negative Alana Forward and negative empty can for left shoulder. She tested positive for pain with resisted pectoral testing  Neurological Screen: na  Balance:  na   Body Structures Involved:  1. Muscles  2. Ligaments Body Functions Affected:  1. Sensory/Pain  2. Neuromusculoskeletal  3. Movement Related Activities and Participation Affected:  1. General Tasks and Demands  2.  Self Care  3. Domestic Life  4. Interpersonal Interactions and Relationships  5. Community, Social and [place]   Number of elements (examined above) that affect the Plan of Care: 4+: HIGH COMPLEXITY   CLINICAL PRESENTATION:   Presentation: Evolving clinical presentation with changing clinical characteristics: MODERATE COMPLEXITY   CLINICAL DECISION MAKING:   Outcome Measure: Tool Used: Disabilities of the Arm, Shoulder and Hand (DASH) Questionnaire - Quick Version  Score:  Initial: 26/55  Most Recent: 23/55 (Date: 6/1/18 )   Interpretation of Score: The DASH is designed to measure the activities of daily living in person's with upper extremity dysfunction or pain. Each section is scored on a 1-5 scale, 5 representing the greatest disability. The scores of each section are added together for a total score of 55. Score 11 12-19 20-28 29-37 38-45 46-54 55   Modifier CH CI CJ CK CL CM CN           Medical Necessity:   · Patient is expected to demonstrate progress in strength and pain levels to increase her ability to use the left arm for housework and computer work. Reason for Services/Other Comments:  · Patient continues to require skilled intervention due to ongoing pain in the left chest along pectorals that radiates into the shoulder blades. this pain affects her ability to perform housework and her computer work. Use of outcome tool(s) and clinical judgement create a POC that gives a: Questionable prediction of patient's progress: MODERATE COMPLEXITY            TREATMENT:   (In addition to Assessment/Re-Assessment sessions the following treatments were rendered)  Pre-treatment Symptoms/Complaints:  Patient reports neck felt better for two days following last treatment . Pain: Initial:   Pain Intensity 1: 2  Post Session:  2/10     THERAPEUTIC EXERCISE: (25 minutes):  Exercises per grid below to improve mobility and strength. Required minimal verbal cues to promote proper body mechanics.   Progressed resistance, range, repetitions and complexity of movement as indicated. Nu-step level 2 x 6 min-held  Upper arm bike x 6 min  Biceps stretching with arm on doorway 3x hold 20 sec  t-bar overhead stretch x 10 hold 5 sec at top-held  t-bar ext stretching x 10 hold 10 sec-held  IR 15# x 20-held  Rows 20# x 20-held  Punches 15# x 20-held  Double arm rows 45# x 25  Shoulder pull downs 45# x 25  Prone shoulder ext 4# x 20-held  Prone shoulder rows 4# x 20 -  Prone h. abd with light assistance x 20 2#-held  Prone scap retract to work on scapular depression-mild cuing required x 15-held  Sidelying ER 3# x 20-held  Bridging x 20-held  Bridging with alternating leg kicks x 20-held  Arm rows underhand  with mini-squat position 45# x 25  Three way arm raises to 90 x 10 with scap retract  Hor abd. Adduction with red TB x 20-held  Seated cervical retraction x 10 hold 5 sec  ER 10# x 20-held  Manual Therapy (   20  min    ): STM to work on the pectorals as well as the upper neck region for improved pain control    Therapeutic Modalities:mechanical traction to cervical region x 15 minutes at 15# for pain relief    HEP:pt to add cervical retraction  Access Code: [email or web address]   URL: [email or web address]   Date: 04/30/2018   Prepared by:    Exercises   Corner Pec Major Stretch - 5 reps - 1 sets - 2x daily   Standing Bicep Stretch at [address] - 5 reps - 1 sets - 2x daily   Chest and Bicep Stretch - Arms Behind Back - 5 reps - 1 sets - 2x daily   Standing Shoulder Posterior Capsule Stretch - 5 reps - 1 sets - 2x daily   Standing Lower Cervical and Upper Thoracic Stretch - 5 reps - 1 sets - 2x daily    Treatment/Session Assessment:    · Response to Treatment:  Patient tolerated treatment with mild discomfort today. Patient seems pleased with her progress thus far and hopes to avoid shots on her next doctor's visit. Reviewed home exercises and stretches instructing patient to do them at least twice a day.    · Compliance with Program/Exercises: compliant  · Recommendations/Intent for next treatment session: \"Next visit will focus on advancements to more challenging activities\".   Total Treatment Duration: 60 min  PT Patient Time In/Time Out  Time In: 0100  Time Out: 0200  Treatment number  Mere Steele

## 2018-07-18 ENCOUNTER — HOSPITAL ENCOUNTER (OUTPATIENT)
Dept: PHYSICAL THERAPY | Age: 43
Discharge: HOME | End: 2018-07-18
Payer: COMMERCIAL

## 2018-07-18 PROCEDURE — 97140 MANUAL THERAPY 1/> REGIONS: CPT

## 2018-07-18 PROCEDURE — 97012 MECHANICAL TRACTION THERAPY: CPT

## 2018-07-18 PROCEDURE — 97110 THERAPEUTIC EXERCISES: CPT

## 2018-07-18 NOTE — PROGRESS NOTES
Karson Kidney  : 1975  Payor: Nicolás Mejía / Plan: [address] HMO / Product Type: HMO /  [address]  at [facility] Therapy  [address], [address]  [phone]   [phone]         OUTPATIENT PHYSICAL THERAPY:Daily Note and Progress Report 2018    ICD-10: Treatment Diagnosis: pain in thoracic spine M54.6, muscle weakness M62.81,     Precautions/Allergies:   Shellfish derived   Fall Risk Score: 0 (? 5 = High Risk)  MD Orders: Eval and treat MEDICAL/REFERRING DIAGNOSIS:  Pain in thoracic spine [M54.6]  Chest pain on breathing [R07.1]  Spinal stenosis, cervical region [M48.02]  Radiculopathy, cervical region [M54.12]  Cervicalgia [M54.2]   DATE OF ONSET: 2018  REFERRING PHYSICIAN: Pam Chavira, Michelle Werner, *  RETURN PHYSICIAN APPOINTMENT: as needed     INITIAL ASSESSMENT:  Ms. Gladys Aguayo presents with increased left chest pain just inferior to the clavicle and then her pain radiates down into the arm slightly and into the shoulder blades and thoracic spine. She reported no injury to the region and the pain worsened in January that she did undergo ER evaluation which came back negative for any cardiac history. She has also had a follow-up with cardiologist which revealed a slight murmur, but echo and treadmill stress testing were normal.  She is reporting her prescribed NSAID is helping to relieve some of the pain. She continues to report folding laundry, housework and computer work can increase her symptoms. She reports lying down relieves her symptoms. She would like to try therapy to see if her symptoms improve. PROBLEM LIST (Impacting functional limitations):  1. Decreased Strength  2. Decreased ADL/Functional Activities  3. Increased Pain  4. Decreased Activity Tolerance INTERVENTIONS PLANNED:  1. Heat  2. Home Exercise Program (HEP)  3. Manual Therapy  4. Therapeutic Exercise/Strengthening  5.  Ultrasound (US) TREATMENT PLAN:  Effective Dates: 7/2/18 TO 8/30/2018 (60 days). Frequency/Duration: 2 times a week for 60 Days    GOALS: (Goals have been discussed and agreed upon with patient.)  Short-Term Functional Goals: Time Frame: 4 weeks  1. Establish independent HEP with no cueing.-met  2. Pt will be able to report 5/10 pain rating or less with ADL's.-met  3. Pt will be able to work on her computer for 20 minutes with less pain.-met  Discharge Goals: Time Frame: 8 weeks  1. Pt will be able to report 3/10 pain rating or less with ADL's.-intermittently meeting  2. Pt will be able to fold laundry, vacuum up to 20 minutes with no increase in symptoms. -met  3. Pt will be able to increase strength in bilateral UE's by at least one full grade for improved lifting abilities. met  Rehabilitation Potential For Stated Goals: Good  Regarding Lois Valentine's therapy, I certify that the treatment plan above will be carried out by a therapist or under their direction. Thank you for this referral,  Tisha Blankenship PT                 The information in this section was collected on 4/30/18 (except where otherwise noted). HISTORY:   History of Present Injury/Illness (Reason for Referral):  Ms. Neal Gandara presents with increased left chest pain just inferior to the clavicle and then her pain radiates down into the arm slightly and into the shoulder blades and thoracic spine. She reported no injury to the region and the pain worsened in January that she did undergo ER evaluation which came back negative for any cardiac history. She has also had a follow-up with cardiologist which revealed a slight murmur, but echo and treadmill stress testing were normal.  She is reporting her prescribed NSAID is helping to relieve some of the pain. She continues to report folding laundry, housework and computer work can increase her symptoms. She reports lying down relieves her symptoms.   She would like to try therapy to see if her symptoms improve. Past Medical History/Comorbidities:   Ms. Radha Barnett  has a past medical history of Dyspepsia and other specified disorders of function of stomach; Irregular heart beat; and Obesity (BMI 30-39.9). She also has no past medical history of Difficult intubation; Malignant hyperthermia due to anesthesia; Nausea & vomiting; Pseudocholinesterase deficiency; or Unspecified adverse effect of anesthesia. Ms. Radha Barnett  has a past surgical history that includes hx orthopaedic; hx  section; hx leep procedure; hx tonsillectomy (childhood); and hx cholecystectomy. Social History/Living Environment:     pt lives with supportive family. She has a 8year old sone and 15year old son  Prior Level of Function/Work/Activity:  Pt works part-time as a   Dominant Side:         RIGHT  Current Medications:       Current Outpatient Prescriptions:     meloxicam (MOBIC) 15 mg tablet, Take 1 Tab by mouth daily. , Disp: 30 Tab, Rfl: 1    cyclobenzaprine (FLEXERIL) 10 mg tablet, Take 1 Tab by mouth nightly., Disp: 30 Tab, Rfl: 1    Magnesium Oxide 500 mg cap, Take  by mouth., Disp: , Rfl:    Date Last Reviewed:  2018     Number of Personal Factors/Comorbidities that affect the Plan of Care: 1-2: MODERATE COMPLEXITY   EXAMINATION:   Palpation:          Increased pain just inferior to clavicle around pec major  ROM:        Bilateral UE motion WFL  Bilateral UE ROM WFL  Cervical ROM WFL                                    Strength:     R UE flex 4/5, abd 4-/5, ER 4-/5, IR 4-/5, biceps 4/5         L UE flex 3+/5, abd 3+/5, ER 3+/5, IR 3+/5, biceps 4-/5            Special Tests: negative Theola Seek and negative empty can for left shoulder. She tested positive for pain with resisted pectoral testing  Neurological Screen: na  Balance:  na   Body Structures Involved:  1. Muscles  2. Ligaments Body Functions Affected:  1. Sensory/Pain  2. Neuromusculoskeletal  3.  Movement Related Activities and Participation Affected:  1. General Tasks and Demands  2. Self Care  3. Domestic Life  4. Interpersonal Interactions and Relationships  5. Community, Social and [place]   Number of elements (examined above) that affect the Plan of Care: 4+: HIGH COMPLEXITY   CLINICAL PRESENTATION:   Presentation: Evolving clinical presentation with changing clinical characteristics: MODERATE COMPLEXITY   CLINICAL DECISION MAKING:   Outcome Measure: Tool Used: Disabilities of the Arm, Shoulder and Hand (DASH) Questionnaire - Quick Version  Score:  Initial: 26/55  Most Recent: 20/55 (Date: 7/18/18 )   Interpretation of Score: The DASH is designed to measure the activities of daily living in person's with upper extremity dysfunction or pain. Each section is scored on a 1-5 scale, 5 representing the greatest disability. The scores of each section are added together for a total score of 55. Score 11 12-19 20-28 29-37 38-45 46-54 55   Modifier CH CI CJ CK CL CM CN           Medical Necessity:   · Patient is expected to demonstrate progress in strength and pain levels to increase her ability to use the left arm for housework and computer work. Reason for Services/Other Comments:  · Patient continues to require skilled intervention due to ongoing pain in the left chest along pectorals that radiates into the shoulder blades. this pain affects her ability to perform housework and her computer work. Use of outcome tool(s) and clinical judgement create a POC that gives a: Questionable prediction of patient's progress: MODERATE COMPLEXITY            TREATMENT:   (In addition to Assessment/Re-Assessment sessions the following treatments were rendered)  Pre-treatment Symptoms/Complaints:  Patient reports she is reporting improvement. Pain: Initial:   Pain Intensity 1: 2  Post Session:  2/10     THERAPEUTIC EXERCISE: (25 minutes):  Exercises per grid below to improve mobility and strength.   Required minimal verbal cues to promote proper body mechanics. Progressed resistance, range, repetitions and complexity of movement as indicated. Nu-step level 2 x 6 min-held  Upper arm bike x 6 min  Biceps stretching with arm on doorway 3x hold 20 sec  t-bar overhead stretch x 10 hold 5 sec at top-held  t-bar ext stretching x 10 hold 10 sec-held  IR 15# x 20-held  Rows 20# x 20-held  Punches 15# x 20-held  Double arm rows 45# x 25  Shoulder pull downs 45# x 25  Prone shoulder ext 4# x 20-held  Prone shoulder rows 4# x 20 -  Prone h. abd with light assistance x 20 2#-held  Prone scap retract to work on scapular depression-mild cuing required x 15-held  Sidelying ER 3# x 20-held  Bridging x 20-held  Bridging with alternating leg kicks x 20-held  Arm rows underhand  with mini-squat position 45# x 25  Three way arm raises to 90 x 10 with scap retract  Hor abd. Adduction with red TB x 20  Seated cervical retraction x 10 hold 5 sec  ER 10# x 20-held  Manual Therapy (   20  min    ): STM to work on the pectorals as well as the upper neck region for improved pain control    Therapeutic Modalities:mechanical traction to cervical region x 15 minutes at 15# for pain relief    HEP:pt to add cervical retraction  Access Code: [email or web address]   URL: [email or web address]   Date: 04/30/2018   Prepared by:    Exercises   Corner Pec Major Stretch - 5 reps - 1 sets - 2x daily   Standing Bicep Stretch at [address] - 5 reps - 1 sets - 2x daily   Chest and Bicep Stretch - Arms Behind Back - 5 reps - 1 sets - 2x daily   Standing Shoulder Posterior Capsule Stretch - 5 reps - 1 sets - 2x daily   Standing Lower Cervical and Upper Thoracic Stretch - 5 reps - 1 sets - 2x daily    Treatment/Session Assessment:    · Response to Treatment:  Patient reporting she is feeling better overall with treatment. She is having less pectoral pain and is reporting up to 2 days of relief with the use of traction.   She does still report the left side of her neck does tighten up, but she has been progressing well with manual therapy and traction. She has been able to increase strength to 4+/5 and reports performing 2-30 minutes of dictation now without increased pain. · Compliance with Program/Exercises: compliant  · Recommendations/Intent for next treatment session: \"Next visit will focus on advancements to more challenging activities\".   Total Treatment Duration: 60 min  PT Patient Time In/Time Out  Time In: 6789  Time Out: 0156  Treatment number [address] [facility] 70, PT

## 2018-07-23 ENCOUNTER — HOSPITAL ENCOUNTER (OUTPATIENT)
Dept: PHYSICAL THERAPY | Age: 43
Discharge: HOME | End: 2018-07-23
Payer: COMMERCIAL

## 2018-07-23 PROCEDURE — 97110 THERAPEUTIC EXERCISES: CPT

## 2018-07-23 PROCEDURE — 97012 MECHANICAL TRACTION THERAPY: CPT

## 2018-07-23 PROCEDURE — 97140 MANUAL THERAPY 1/> REGIONS: CPT

## 2018-07-23 NOTE — PROGRESS NOTES
Zeferino Nicholas  : 1975  Payor: Gato Babcock / Plan: [employer] / Product Type: HMO /  [address] [place]  at [facility] Therapy  [address], [address]  [phone]   [phone]         OUTPATIENT PHYSICAL THERAPY:Daily Note 2018    ICD-10: Treatment Diagnosis: pain in thoracic spine M54.6, muscle weakness M62.81,     Precautions/Allergies:   Shellfish derived   Fall Risk Score: 0 (? 5 = High Risk)  MD Orders: Eval and treat MEDICAL/REFERRING DIAGNOSIS:  Pain in thoracic spine [M54.6]  Chest pain on breathing [R07.1]  Spinal stenosis, cervical region [M48.02]  Radiculopathy, cervical region [M54.12]  Cervicalgia [M54.2]   DATE OF ONSET: 2018  REFERRING PHYSICIAN: Sarbjit Terry, *  RETURN PHYSICIAN APPOINTMENT: as needed     INITIAL ASSESSMENT:  Ms. Mike Montes presents with increased left chest pain just inferior to the clavicle and then her pain radiates down into the arm slightly and into the shoulder blades and thoracic spine. She reported no injury to the region and the pain worsened in January that she did undergo ER evaluation which came back negative for any cardiac history. She has also had a follow-up with cardiologist which revealed a slight murmur, but echo and treadmill stress testing were normal.  She is reporting her prescribed NSAID is helping to relieve some of the pain. She continues to report folding laundry, housework and computer work can increase her symptoms. She reports lying down relieves her symptoms. She would like to try therapy to see if her symptoms improve. PROBLEM LIST (Impacting functional limitations):  1. Decreased Strength  2. Decreased ADL/Functional Activities  3. Increased Pain  4. Decreased Activity Tolerance INTERVENTIONS PLANNED:  1. Heat  2. Home Exercise Program (HEP)  3. Manual Therapy  4. Therapeutic Exercise/Strengthening  5.  Ultrasound (US)   TREATMENT PLAN:  Effective Dates: 7/2/18 TO 8/30/2018 (60 days). Frequency/Duration: 2 times a week for 60 Days    GOALS: (Goals have been discussed and agreed upon with patient.)  Short-Term Functional Goals: Time Frame: 4 weeks  1. Establish independent HEP with no cueing.-met  2. Pt will be able to report 5/10 pain rating or less with ADL's.-met  3. Pt will be able to work on her computer for 20 minutes with less pain.-met  Discharge Goals: Time Frame: 8 weeks  1. Pt will be able to report 3/10 pain rating or less with ADL's.-intermittently meeting  2. Pt will be able to fold laundry, vacuum up to 20 minutes with no increase in symptoms. -met  3. Pt will be able to increase strength in bilateral UE's by at least one full grade for improved lifting abilities. met  Rehabilitation Potential For Stated Goals: Good  Regarding Catia Crockett Jacquelinelizette's therapy, I certify that the treatment plan above will be carried out by a therapist or under their direction. Thank you for this referral,  Yesi Armstrong, PT                 The information in this section was collected on 4/30/18 (except where otherwise noted). HISTORY:   History of Present Injury/Illness (Reason for Referral):  Ms. Victor Hugo Patino presents with increased left chest pain just inferior to the clavicle and then her pain radiates down into the arm slightly and into the shoulder blades and thoracic spine. She reported no injury to the region and the pain worsened in January that she did undergo ER evaluation which came back negative for any cardiac history. She has also had a follow-up with cardiologist which revealed a slight murmur, but echo and treadmill stress testing were normal.  She is reporting her prescribed NSAID is helping to relieve some of the pain. She continues to report folding laundry, housework and computer work can increase her symptoms. She reports lying down relieves her symptoms. She would like to try therapy to see if her symptoms improve.   Past Medical History/Comorbidities:   Ms. Jyoti Jules  has a past medical history of Dyspepsia and other specified disorders of function of stomach; Irregular heart beat; and Obesity (BMI 30-39.9). She also has no past medical history of Difficult intubation; Malignant hyperthermia due to anesthesia; Nausea & vomiting; Pseudocholinesterase deficiency; or Unspecified adverse effect of anesthesia. Ms. Jyoti Jules  has a past surgical history that includes hx orthopaedic; hx  section; hx leep procedure; hx tonsillectomy (childhood); and hx cholecystectomy. Social History/Living Environment:     pt lives with supportive family. She has a 8year old sone and 15year old son  Prior Level of Function/Work/Activity:  Pt works part-time as a   Dominant Side:         RIGHT  Current Medications:       Current Outpatient Prescriptions:     meloxicam (MOBIC) 15 mg tablet, Take 1 Tab by mouth daily. , Disp: 30 Tab, Rfl: 1    cyclobenzaprine (FLEXERIL) 10 mg tablet, Take 1 Tab by mouth nightly., Disp: 30 Tab, Rfl: 1    Magnesium Oxide 500 mg cap, Take  by mouth., Disp: , Rfl:    Date Last Reviewed:  2018     Number of Personal Factors/Comorbidities that affect the Plan of Care: 1-2: MODERATE COMPLEXITY   EXAMINATION:   Palpation:          Increased pain just inferior to clavicle around pec major  ROM:        Bilateral UE motion WFL  Bilateral UE ROM WFL  Cervical ROM WFL                                    Strength:     R UE flex 4/5, abd 4-/5, ER 4-/5, IR 4-/5, biceps 4/5         L UE flex 3+/5, abd 3+/5, ER 3+/5, IR 3+/5, biceps 4-/5            Special Tests: negative Meredeth Keli and negative empty can for left shoulder. She tested positive for pain with resisted pectoral testing  Neurological Screen: na  Balance:  na   Body Structures Involved:  1. Muscles  2. Ligaments Body Functions Affected:  1. Sensory/Pain  2. Neuromusculoskeletal  3.  Movement Related Activities and Participation Affected:  1. General Tasks and Demands  2. Self Care  3. Domestic Life  4. Interpersonal Interactions and Relationships  5. Community, Social and [place]   Number of elements (examined above) that affect the Plan of Care: 4+: HIGH COMPLEXITY   CLINICAL PRESENTATION:   Presentation: Evolving clinical presentation with changing clinical characteristics: MODERATE COMPLEXITY   CLINICAL DECISION MAKING:   Outcome Measure: Tool Used: Disabilities of the Arm, Shoulder and Hand (DASH) Questionnaire - Quick Version  Score:  Initial: 26/55  Most Recent: 20/55 (Date: 7/18/18 )   Interpretation of Score: The DASH is designed to measure the activities of daily living in person's with upper extremity dysfunction or pain. Each section is scored on a 1-5 scale, 5 representing the greatest disability. The scores of each section are added together for a total score of 55. Score 11 12-19 20-28 29-37 38-45 46-54 55   Modifier CH CI CJ CK CL CM CN           Medical Necessity:   · Patient is expected to demonstrate progress in strength and pain levels to increase her ability to use the left arm for housework and computer work. Reason for Services/Other Comments:  · Patient continues to require skilled intervention due to ongoing pain in the left chest along pectorals that radiates into the shoulder blades. this pain affects her ability to perform housework and her computer work. Use of outcome tool(s) and clinical judgement create a POC that gives a: Questionable prediction of patient's progress: MODERATE COMPLEXITY            TREATMENT:   (In addition to Assessment/Re-Assessment sessions the following treatments were rendered)  Pre-treatment Symptoms/Complaints:  Patient reports she feels much better. Pain: Initial:   Pain Intensity 1: 2  Post Session:  2/10     THERAPEUTIC EXERCISE: (25 minutes):  Exercises per grid below to improve mobility and strength. Required minimal verbal cues to promote proper body mechanics. Progressed resistance, range, repetitions and complexity of movement as indicated. Nu-step level 2 x 6 min-held  Upper arm bike x 6 min  Biceps stretching with arm on doorway 3x hold 20 sec  t-bar overhead stretch x 10 hold 5 sec at top-held  t-bar ext stretching x 10 hold 10 sec-held  IR 15# x 20-held  Rows 20# x 20-held  Punches 15# x 20-held  Double arm rows 45# x 25  Shoulder pull downs 45# x 25  Prone shoulder ext 4# x 20-held  Prone shoulder rows 4# x 20 -  Prone h. abd with light assistance x 20 2#-held  Prone scap retract to work on scapular depression-mild cuing required x 15-held  Sidelying ER 3# x 20-held  Arm rows underhand  with mini-squat position 45# x 25  Three way arm raises to 90 x 10 with scap retract-held  Hor abd. Adduction with red TB x 20  Seated cervical retraction x 10 hold 5 sec  ER 10# x 20-held  Bent over rows and arm ext 6# x 25  Manual Therapy (   15  min    ): STM to work on the pectorals as well as the upper neck region for improved pain control    Therapeutic Modalities:mechanical traction to cervical region x 15 minutes at 15# for pain relief    HEP:pt to add cervical retraction  Access Code: [email or web address]   URL: [email or web address]   Date: 04/30/2018   Prepared by:    Exercises   Corner Pec Major Stretch - 5 reps - 1 sets - 2x daily   Standing Bicep Stretch at [address] - 5 reps - 1 sets - 2x daily   Chest and Bicep Stretch - Arms Behind Back - 5 reps - 1 sets - 2x daily   Standing Shoulder Posterior Capsule Stretch - 5 reps - 1 sets - 2x daily   Standing Lower Cervical and Upper Thoracic Stretch - 5 reps - 1 sets - 2x daily    Treatment/Session Assessment:    · Response to Treatment:  Patient reporting she is feeling much better. She is having less overall pain and when the pain occurs it is not as intense. She is progressing well with strengthening and is performing exercises at home.    She will be checking on coverage for a home traction unit as this might be a possible treatment option for home. She reports also being able to work more at her computer with less pain. · Compliance with Program/Exercises: compliant  · Recommendations/Intent for next treatment session: \"Next visit will focus on advancements to more challenging activities\".   Total Treatment Duration: 55 min  PT Patient Time In/Time Out  Time In: 0100  Time Out: 0155  Treatment number TAYLOR Patel

## 2018-07-26 ENCOUNTER — HOSPITAL ENCOUNTER (OUTPATIENT)
Dept: PHYSICAL THERAPY | Age: 43
Discharge: HOME | End: 2018-07-26
Payer: COMMERCIAL

## 2018-07-26 NOTE — PROGRESS NOTES
Therapy Center at [address]   [address] [place] [address], [facility] [address]  [phone]   [phone]    OUTPATIENT DAILY NOTE    NAME/AGE/GENDER: Timothy Higgins is a 44 y.o. female. DATE: 7/26/2018    Patient cancelled appointment today. Will plan to follow up on next scheduled visit.     Margret Mckeon, PT

## 2018-09-26 NOTE — THERAPY DISCHARGE
Marco Larry  : 1975  Payor: Lauren Glynn / Plan: [address] HMO / Product Type: HMO /  [address] at [place] [address] Therapy  [address], [address]  [phone]   [phone]         OUTPATIENT PHYSICAL THERAPY:Discontinuation Summary 2018    ICD-10: Treatment Diagnosis: pain in thoracic spine M54.6, muscle weakness M62.81,     Precautions/Allergies:   Shellfish derived   Fall Risk Score: 0 (? 5 = High Risk)  MD Orders: Eval and treat MEDICAL/REFERRING DIAGNOSIS:  Pain in thoracic spine [M54.6]  Chest pain on breathing [R07.1]   DATE OF ONSET: 2018  REFERRING PHYSICIAN: Gunjan Jerez, *  RETURN PHYSICIAN APPOINTMENT: as needed     INITIAL ASSESSMENT:  Ms. Neal Gandara presents with increased left chest pain just inferior to the clavicle and then her pain radiates down into the arm slightly and into the shoulder blades and thoracic spine. She reported no injury to the region and the pain worsened in January that she did undergo ER evaluation which came back negative for any cardiac history. She has also had a follow-up with cardiologist which revealed a slight murmur, but echo and treadmill stress testing were normal.  She is reporting her prescribed NSAID is helping to relieve some of the pain. She continues to report folding laundry, housework and computer work can increase her symptoms. She reports lying down relieves her symptoms. She would like to try therapy to see if her symptoms improve. PROBLEM LIST (Impacting functional limitations):  1. Decreased Strength  2. Decreased ADL/Functional Activities  3. Increased Pain  4. Decreased Activity Tolerance INTERVENTIONS PLANNED:  1. Heat  2. Home Exercise Program (HEP)  3. Manual Therapy  4. Therapeutic Exercise/Strengthening  5. Ultrasound (US)   TREATMENT PLAN:  Effective Dates: 18 TO 2018 (60 days).  Frequency/Duration: 2 times a week for 60 Days    GOALS: (Goals have been discussed and agreed upon with patient.)  Short-Term Functional Goals: Time Frame: 4 weeks  1. Establish independent HEP with no cueing.-met  2. Pt will be able to report 5/10 pain rating or less with ADL's.-met  3. Pt will be able to work on her computer for 20 minutes with less pain.-met  Discharge Goals: Time Frame: 8 weeks  1. Pt will be able to report 3/10 pain rating or less with ADL's.-intermittently meeting  2. Pt will be able to fold laundry, vacuum up to 20 minutes with no increase in symptoms. -met  3. Pt will be able to increase strength in bilateral UE's by at least one full grade for improved lifting abilities. met  Rehabilitation Potential For Stated Goals: Good  Regarding Shanita Valentine's therapy, I certify that the treatment plan above will be carried out by a therapist or under their direction. Thank you for this referral,  Mojgan Jarquin PT Isoalistair Sloan was  seen in physical therapy from 4/30/18 to 7/23/18 for 14 visits. Treatment has been discontinued at this time due to patient failing to return for additional treatment. At her last appointment, pt reported feeling better and that she was able to complete her housework and computer work for longer periods of time and with less overall pain. She was pleased with her progress thus far. She did not return for any additional treatment after cancelling her last scheduled appointment.   Thank you for this referral.     Jennefer Gowers, DPT